# Patient Record
Sex: MALE | Race: WHITE | NOT HISPANIC OR LATINO | Employment: FULL TIME | URBAN - METROPOLITAN AREA
[De-identification: names, ages, dates, MRNs, and addresses within clinical notes are randomized per-mention and may not be internally consistent; named-entity substitution may affect disease eponyms.]

---

## 2023-11-09 ENCOUNTER — OFFICE VISIT (OUTPATIENT)
Dept: ORTHOPEDIC SURGERY | Facility: CLINIC | Age: 43
End: 2023-11-09
Payer: OTHER MISCELLANEOUS

## 2023-11-09 VITALS
HEIGHT: 71 IN | SYSTOLIC BLOOD PRESSURE: 118 MMHG | BODY MASS INDEX: 26.29 KG/M2 | DIASTOLIC BLOOD PRESSURE: 68 MMHG | WEIGHT: 187.8 LBS
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
## 2023-11-09 DIAGNOSIS — M75.51 BURSITIS OF RIGHT SHOULDER: ICD-10-CM

## 2023-11-09 DIAGNOSIS — R29.818 PSEUDOPARALYSIS: ICD-10-CM

## 2023-11-09 DIAGNOSIS — M75.41 IMPINGEMENT SYNDROME OF RIGHT SHOULDER: ICD-10-CM

## 2023-11-09 DIAGNOSIS — S46.001A ROTATOR CUFF INJURY, RIGHT, INITIAL ENCOUNTER: Primary | ICD-10-CM

## 2023-11-09 PROCEDURE — 99204 OFFICE O/P NEW MOD 45 MIN: CPT | Performed by: ORTHOPAEDIC SURGERY

## 2023-11-09 NOTE — PROGRESS NOTES
[facility] Office Visit - Isac Arrieta MD    Office Visit       Patient Name: Heladio Guadalupe    Chief Complaint:   Chief Complaint   Patient presents with   • Right Shoulder - Pain       Referring Physician: Mukesh Lindsey MD  - I appreciate the referral      History of Present Illness:   Heladio Guadalupe is a 42 y.o. male who presents with right body part: shoulder Reason: pain.  Onset:Onset: atraumatic and gradual in nature. The issue has been ongoing for 3 month(s). Pain is a 5/10 on the pain scale. Pain is described as Pain Characterization: throbbing. Associated symptoms include Symptoms: pain and popping. The pain is worse with sleeping, working, lying on affected side, and any movement of the joint; ice and pain medication and/or NSAID improve the pain. Previous treatments have included: NSAIDS. I have reviewed the patient's history of present illness as noted/entered above.    I have reviewed the patient's past medical history, surgical history, social history, family history, medications, and allergies as noted in the electronic medical record and as noted/entered.  I have reviewed the patient's review of systems as noted/enter and updated as noted in the patient's HPI.      RIGHT SHOULDER ACUTE INJURY WORK INJURY    WORKERS' COMPENSATION INJURY  Employer: [employer]  Job at work: manual labor, heavy duty  Date of Injury at Work: 9/22/2023 -- approximately 6-7 weeks prior to my first evaluation  [facility] occupational medicine note from Dr. Lindsey reviewed from 10/19/2023 --I agree with his assessment that the patient may have an acute work-related rotator cuff injury.  MRI indicated at this time.    Mechanism of Injury at Work: He notes he was working with a grout pump hose that was clogged and 3 different times it jerked and finally his right arm gave out with acute pain to the  shoulder.    Right-hand-dominant  Current Work Status: Light duty  TREATMENTS: Activity modifications, self exercise, work modifications  Diagnostic Tests: X-rays reviewed-no acute bony findings    [place]  Working on a project and [employer]-school        42 y.o. male  Body mass index is 26.19 kg/m².    Subjective   Subjective      Review of Systems   Constitutional:  Negative for chills, fever, unexpected weight gain and unexpected weight loss.   HENT:  Negative for congestion, postnasal drip and rhinorrhea.    Eyes:  Negative for blurred vision.   Respiratory:  Negative for shortness of breath.    Cardiovascular:  Negative for leg swelling.   Gastrointestinal:  Negative for abdominal pain, nausea and vomiting.   Genitourinary:  Negative for difficulty urinating.   Musculoskeletal:  Positive for arthralgias. Negative for gait problem, joint swelling and myalgias.   Skin:  Negative for skin lesions and wound.   Neurological:  Negative for dizziness, weakness, light-headedness and numbness.   Hematological:  Does not bruise/bleed easily.   Psychiatric/Behavioral:  Negative for depressed mood.         Past Medical History: History reviewed. No pertinent past medical history.    Past Surgical History:   Past Surgical History:   Procedure Laterality Date   • FRACTURE SURGERY Left 2001    left arm       Family History: History reviewed. No pertinent family history.    Social History:   Social History     Socioeconomic History   • Marital status: Single   Tobacco Use   • Smoking status: Every Day     Types: Cigarettes   • Smokeless tobacco: Never   Vaping Use   • Vaping Use: Never used   Substance and Sexual Activity   • Alcohol use: Never   • Drug use: Never   • Sexual activity: Defer       Medications: No current outpatient medications on file.    Allergies: No Known Allergies    The following portions of the patient's history were reviewed and updated as appropriate: allergies, current medications, past  "family history, past medical history, past social history, past surgical history and problem list.        Objective    Objective      Vital Signs:   Vitals:    11/09/23 1332   BP: 118/68   Weight: 85.2 kg (187 lb 12.8 oz)   Height: 180.3 cm (71\")       Ortho Exam:  General: no acute distress, comfortable  Vitals reviewed in chart    Musculoskeletal Exam    SIDE: Right shoulder  Shoulder Exam:    Tenderness: rotator cuff    Range of motion measurements (degrees)  Forward flexion/Abduction/External rotation at side/ER at 90/IR at 90/IR position  Active: Essentially pseudoparalytic with the right arm unable to abduct more than 70 degrees can forward flex 100 degrees, 40 degrees external rotation at the side  Passive: Pain limited 120/120/45/80/70    Painful arc of motion: yes  No evidence of septic joint  Pain with forward flexion and abduction greater: 90 degrees  Impingement testing Neer's test - positive/painful  Impingement testing Hawkin's test - positive/painful    Rotator Cuff Testing:  Tenderness to palpation at rotator cuff - YES  Rotator cuff testing Mirela's test - positive  Rotator cuff testing External rotation - no  Rotator cuff testing Lag signs -yes with Jobes testing  Rotator cuff testing Belly press - no  Pain with abduction great than 90 degrees - yes    Scapular dyskinesis - present, abnormal scapular motion    Long head of the biceps testing:  Montejo's test for biceps & Speed's test -painful  Bicipital groove tenderness to palpation/tenderness to palpation of biceps tendon -painful      Results Review:   Imaging Results (Last 24 Hours)       ** No results found for the last 24 hours. **          Right shoulder radiographic images 10/19/2023 [employer] right shoulder-no acute bony findings on my review    Procedures             Assessment / Plan      Assessment/Plan:   Problem List Items Addressed This Visit          Musculoskeletal and Injuries    Work related injury    Relevant Orders    MRI " Shoulder Right Without Contrast    Bursitis of right shoulder    Relevant Orders    MRI Shoulder Right Without Contrast    Impingement syndrome of right shoulder    Relevant Orders    MRI Shoulder Right Without Contrast       Other    Rotator cuff injury, right, initial encounter - Primary    Relevant Orders    MRI Shoulder Right Without Contrast    Pseudoparalysis       RIGHT SHOULDER    Diagnoses: as noted above    Mechanism of injury: Work injury    Treatment Plan: MRI right shoulder    Work status:   Work status:   RIGHT ARM  10 lbs lifting restriction with the affected arm  No overhead lifting with the affected arm  No repetitive lift push pull with the affected arm      MMI: not reached    Projection for MMI: To be determined    RIGHT SHOULDER  MRI of the shoulder is recommended.  Indication: suspected rotator cuff tear  The MRI is critical to evaluate for rotator cuff tearing and will help with possible surgical planning.      Follow Up: AFTER RIGHT SHOULDER MRI        Isac Arrieta MD, FAAOS  Orthopedic Surgeon  Fellowship Trained Shoulder and Elbow Surgeon  [facility]  Orthopedics and Sports Medicine  [address] [place] [address], [address]  [place] [address]    11/09/23  14:52 EST

## 2023-11-14 ENCOUNTER — TELEPHONE (OUTPATIENT)
Dept: ORTHOPEDIC SURGERY | Facility: CLINIC | Age: 43
End: 2023-11-14

## 2023-11-14 NOTE — TELEPHONE ENCOUNTER
Caller: Heladio Guadalupe    Relationship to patient: Self    Best call back number: [phone] (home)       Chief complaint: RIGHT SHOULDER    Type of visit: MRI    Requested date: CALLED TO CHECK STATUS     If rescheduling, when is the original appointment: NA     Additional notes:PATIENT CAN SEE THE ORDER IN HIS MY CHART. HE WANTS AN UPDATE AND TO KNOW IF THERE IS ANYTHING HE CAN DO TO MOVE THE PROCESS ALONG.

## 2023-12-10 ENCOUNTER — HOSPITAL ENCOUNTER (OUTPATIENT)
Dept: MRI IMAGING | Facility: HOSPITAL | Age: 43
Discharge: HOME | End: 2023-12-10
Admitting: ORTHOPAEDIC SURGERY
Payer: OTHER MISCELLANEOUS

## 2023-12-10 DIAGNOSIS — S46.001A ROTATOR CUFF INJURY, RIGHT, INITIAL ENCOUNTER: ICD-10-CM

## 2023-12-10 DIAGNOSIS — M75.41 IMPINGEMENT SYNDROME OF RIGHT SHOULDER: ICD-10-CM

## 2023-12-10 DIAGNOSIS — M75.51 BURSITIS OF RIGHT SHOULDER: ICD-10-CM
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
## 2023-12-10 PROCEDURE — 73221 MRI JOINT UPR EXTREM W/O DYE: CPT

## 2023-12-12 ENCOUNTER — OFFICE VISIT (OUTPATIENT)
Dept: ORTHOPEDIC SURGERY | Facility: CLINIC | Age: 43
End: 2023-12-12
Payer: OTHER MISCELLANEOUS

## 2023-12-12 VITALS
WEIGHT: 185 LBS | HEIGHT: 71 IN | DIASTOLIC BLOOD PRESSURE: 70 MMHG | SYSTOLIC BLOOD PRESSURE: 120 MMHG | BODY MASS INDEX: 25.9 KG/M2

## 2023-12-12 DIAGNOSIS — R29.818 PSEUDOPARALYSIS: ICD-10-CM

## 2023-12-12 DIAGNOSIS — M75.41 IMPINGEMENT SYNDROME OF RIGHT SHOULDER: ICD-10-CM

## 2023-12-12 DIAGNOSIS — M75.51 BURSITIS OF RIGHT SHOULDER: ICD-10-CM
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
## 2023-12-12 DIAGNOSIS — S46.001A ROTATOR CUFF INJURY, RIGHT, INITIAL ENCOUNTER: Primary | ICD-10-CM

## 2023-12-12 NOTE — PROGRESS NOTES
[facility] [place] [facility] Office Follow Up       Office Follow Up Visit       Patient Name: Heladio Guadalupe    Chief Complaint:   Chief Complaint   Patient presents with    Follow-up     1 month (MRI) follow-up: Rotator cuff injury, right       Referring Physician: No ref. provider found    History of Present Illness:   It has been 1  month(s) since Heladoi Guadalupe's last visit. Heladio Guadalupe returns to clinic today for F/U: follow-up of rightBody Part: shoulderReason: pain. The issue has been ongoing for 3 month(s) (DOI: 9-20-23). Heladio Guadalupe rates HIS/HER: hispain at 4/10 on the pain scale. Previous/current treatments: bracing and NSAIDS. Current symptoms:Symptoms: pain, popping, grinding, and giving way/buckling. The pain is worse with sleeping, working, and lying on affected side; resting and ice improves the pain. Overall, he/she: heis doing the same. I have reviewed the patient's history of present illness as noted/entered above.    I have reviewed the patient's past medical history, surgical history, social history, family history, medications, and allergies as noted in the electronic medical record and as noted/entered.  I have reviewed the patient's review of systems as noted/enter and updated as noted in the patient's HPI.      RIGHT SHOULDER ACUTE INJURY WORK INJURY     WORKERS' COMPENSATION INJURY  Employer: [employer]  Job at work: manual labor, heavy duty  Date of Injury at Work: 9/22/2023 -- approximately 6-7 weeks prior to my first evaluation  [facility] occupational medicine note from Dr. Lindsey reviewed from 10/19/2023 --I agree with his assessment that the patient may have an acute work-related rotator cuff injury.  MRI indicated at this time.     Mechanism of Injury at Work: He notes he was working with a grout pump hose that was clogged and 3 different times it jerked and finally his right arm gave out with acute pain  to the shoulder.     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 male  Body mass index is 26.19 kg/m².      12/12/2023:  RIGHT SHOULDER  Work injury  Feels the same as prior  MRI COMPLETED  He presented essentially pseudoparalytic in the right shoulder but fortunately clinically does appear to be somewhat better today.  MRI was reassuring as well because we were expecting possible large massive avulsion but he has a small partial bursal tear.  In light of these findings we counseled on operative and nonoperative measures I am very hopeful that he will respond to conservative course.  It can be frustrating and a work compensation scenario as the hope would be to restored to the prior preinjury state however in this case the partial tearing would likely not indicate a need for operative intervention although the partial tearing can persist.  The tearing is more intrasubstance and more medial rather than at the bone tendon interface.    Formal physical therapy is recommended at this time    [facility]      Subjective   Subjective      Review of Systems   Constitutional: Negative.  Negative for chills, fatigue and fever.   HENT: Negative.  Negative for congestion and dental problem.    Eyes: Negative.  Negative for blurred vision.   Respiratory: Negative.  Negative for shortness of breath.    Cardiovascular: Negative.  Negative for leg swelling.   Gastrointestinal: Negative.  Negative for abdominal pain.   Endocrine: Negative.  Negative for polyuria.   Genitourinary: Negative.  Negative for difficulty urinating.   Musculoskeletal:  Positive for arthralgias.   Skin: Negative.    Allergic/Immunologic: Negative.    Neurological: Negative.    Hematological: Negative.  Negative for adenopathy.  "  Psychiatric/Behavioral: Negative.  Negative for behavioral problems.         Past Medical History:   Past Medical History:   Diagnosis Date    Rotator cuff syndrome        Past Surgical History:   Past Surgical History:   Procedure Laterality Date    FRACTURE SURGERY Left 2001    left arm       Family History: History reviewed. No pertinent family history.    Social History:   Social History     Socioeconomic History    Marital status: Single   Tobacco Use    Smoking status: Every Day     Packs/day: 1.00     Years: 15.00     Additional pack years: 0.00     Total pack years: 15.00     Types: Cigarettes    Smokeless tobacco: Never   Vaping Use    Vaping Use: Never used   Substance and Sexual Activity    Alcohol use: Never    Drug use: Never    Sexual activity: Yes     Partners: Female     Birth control/protection: Condom, Same-sex partner       Medications: No current outpatient medications on file.    Allergies: No Known Allergies    The following portions of the patient's history were reviewed and updated as appropriate: allergies, current medications, past family history, past medical history, past social history, past surgical history and problem list.        Objective    Objective      Vital Signs:   Vitals:    12/12/23 1511   BP: 120/70   Weight: 83.9 kg (185 lb)   Height: 180 cm (70.87\")       Ortho Exam:  Right shoulder initially presented with concerns for profound pseudoparalysis.  With encouragement and additional testing today does appear to have improved active and passive range of motion but still has significant pain response and limitations with forward flexion abduction and Jobes testing.  Positive Michael positive Twyla.    Results Review:  Imaging Results (Last 24 Hours)       ** No results found for the last 24 hours. **            MRI Shoulder Right Without Contrast    Result Date: 12/11/2023  Impression: Irregular moderate-grade bursal-sided and interstitial tearing of the mid distal fibers of " the supraspinatus tendon at and immediately proximal to the footprint. Focal tear of the posterior superior labrum. Electronically Signed: Pavan Mcnally MD  12/11/2023 10:31 AM EST  Workstation ID: [id number]      I personally reviewed the imaging above.  Small partial intrasubstance tearing and some bursal sided tearing of the supraspinatus.  Degenerative labral findings noted as well-SLAP      Procedures            Assessment / Plan      Assessment/Plan:   Problem List Items Addressed This Visit          Musculoskeletal and Injuries    Work related injury    Relevant Orders    Ambulatory Referral to Physical Therapy Evaluate and treat, Ortho (Completed)    Bursitis of right shoulder    Relevant Orders    Ambulatory Referral to Physical Therapy Evaluate and treat, Ortho (Completed)    Impingement syndrome of right shoulder    Relevant Orders    Ambulatory Referral to Physical Therapy Evaluate and treat, Ortho (Completed)       Other    Rotator cuff injury, right, initial encounter - Primary    Relevant Orders    Ambulatory Referral to Physical Therapy Evaluate and treat, Ortho (Completed)    Pseudoparalysis       RIGHT SHOULDER     Diagnoses: as noted above     Mechanism of injury: Work injury     Treatment Plan: PT Rx    Counseled on operative versus nonoperative measures.  Nonoperative measures recommended.  Small partial intrasubstance and bursal sided fraying of the rotator cuff.      Counseled on the importance of work providing him time to perform his physical therapy so that he can have ample time to strengthen the shoulder and to recover.     Work status:   Work status:   RIGHT ARM  10 lbs lifting restriction with the affected arm  No overhead lifting with the affected arm  No repetitive lift push pull with the affected arm        MMI: not reached     Projection for MMI: To be determined    PT Rx    Follow Up: 2 months      Isac Arrieta MD, FAAOS  Orthopedic Surgeon  Fellowship Trained Shoulder and  Elbow Surgeon  [facility]  Orthopedics and Sports Medicine  [address], [address]  [place] [address]    12/12/23  16:14 EST

## 2023-12-20 ENCOUNTER — TREATMENT (OUTPATIENT)
Dept: PHYSICAL THERAPY | Facility: CLINIC | Age: 43
End: 2023-12-20
Payer: OTHER MISCELLANEOUS

## 2023-12-20 DIAGNOSIS — G89.29 CHRONIC RIGHT SHOULDER PAIN: Primary | ICD-10-CM

## 2023-12-20 DIAGNOSIS — M25.511 CHRONIC RIGHT SHOULDER PAIN: Primary | ICD-10-CM

## 2023-12-20 PROCEDURE — 97162 PT EVAL MOD COMPLEX 30 MIN: CPT | Performed by: PHYSICAL THERAPIST

## 2023-12-20 PROCEDURE — 97140 MANUAL THERAPY 1/> REGIONS: CPT | Performed by: PHYSICAL THERAPIST

## 2023-12-20 PROCEDURE — 97110 THERAPEUTIC EXERCISES: CPT | Performed by: PHYSICAL THERAPIST

## 2023-12-20 NOTE — PROGRESS NOTES
Physical Therapy Initial Evaluation and Plan of Care  [address]  [place] [address]    Patient: Heladio Guadalupe   : 1980  Diagnosis/ICD-10 Code:  No primary diagnosis found.  Referring practitioner: Isac Arrieta MD  Date of Initial Visit: 2023  Today's Date: 2023  Patient seen for Visit count could not be calculated. Make sure you are using a visit which is associated with an episode. session         Visit Diagnoses:  No diagnosis found.      Subjective Questionnaire: QuickDASH: 29.55      Subjective Evaluation    History of Present Illness  Mechanism of injury: Pt is a 43 year old male presenting to the clinic with a right shoulder injury that occurred at work 23. 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 The hose got clogged three separate times, and the last time the hose jerked his arm into external rotation. This pulled on his shoulder and caused pain. He went to see an ortho and got an MRI revealing a bursal and posterior labrum tear. He is currently on a weight restriction of 10 lbs at work. He is having the most pain when he lays down at night. He also have pain with reaching out to the side. He takes ibuprofen as needed. His ortho is hopeful he will respond to conservative management and will not require surgery.      Patient Occupation: [employer] Quality of life: excellent    Pain  Current pain ratin  At worst pain ratin  Quality: throbbing and dull ache  Aggravating factors: prolonged positioning, sleeping, repetitive movement and overhead activity  Progression: no change    Hand dominance: right    Diagnostic Tests  MRI studies: abnormal (see chart)    Patient Goals  Patient goals for therapy: decreased pain, increased motion, increased strength, independence with ADLs/IADLs, return to sport/leisure activities and return to work             Objective          Tenderness     Right Shoulder  Tenderness in  the subacromial bursa and supraspinatus tendon.     Active Range of Motion   Left Shoulder   Flexion: 170 degrees   Abduction: 175 degrees     Right Shoulder   Flexion: 151 degrees   Abduction: 118 degrees with pain    Additional Active Range of Motion Details  Apleys ER right: T3  Apleys ER left: T4  Apleys IR right: T10  Apleys IR left: T10    Strength/Myotome Testing     Left Shoulder     Planes of Motion   Flexion: 5   Abduction: 5   External rotation at 0°: 5   Internal rotation at 0°: 5     Isolated Muscles   Biceps: 5     Right Shoulder     Planes of Motion   Flexion: 5   Abduction: 4-   External rotation at 0°: 4+   Internal rotation at 0°: 5     Isolated Muscles   Biceps: 5     Tests     Right Shoulder   Positive Speed's.   Negative empty can and Hawkin's.           Assessment & Plan       Assessment  Impairments: abnormal muscle tone, abnormal or restricted ROM, activity intolerance, impaired physical strength, lacks appropriate home exercise program and pain with function   Functional limitations: lifting, sleeping, pulling, pushing, uncomfortable because of pain, reaching overhead and unable to perform repetitive tasks   Assessment details: Pt is a 43 year old male presenting to the clinic with right shoulder pain due to an injury at work. His MRI revealed small bursal and posterior labrum tear. He demonstrates decreased AROM, decrease strength, and positive Speeds test. His impairments are limiting his ability to sleep comfortably and perform his full duties at work. Pt would benefit from skilled PT to address his impairments so he can reach his long term goals.  Prognosis: good    Goals  Plan Goals: SHORT TERM GOALS:     2 weeks  1. Pt I w/ HEP  2. Pt to demonstrate PROM of the right shoulder to WFL to improve ability to perform ADL's  3. Pt to demonstrate ability to perform 30 minutes continuous activity in the clinic without increase in pain     LONG TERM GOALS:   6 weeks  1. Pt to demonstrate AROM  of the right shoulder to WNL to allow ability to perform all necessary functional activities  2. Pt to demonstrate ability to lift 5# OH with the right arm without increase in pain  3. Pt to report being able to work full duty without increase in pain in the shoulder  4. Pt to tolerate 60 minutes continuous activity in the clinic without increase in pain      Plan  Therapy options: will be seen for skilled therapy services  Planned modality interventions: cryotherapy, dry needling, electrical stimulation/Russian stimulation, high voltage pulsed current (pain management), TENS, thermotherapy (hydrocollator packs) and ultrasound  Planned therapy interventions: manual therapy, neuromuscular re-education, postural training, soft tissue mobilization, spinal/joint mobilization, strengthening, stretching, therapeutic activities, home exercise program, functional ROM exercises and body mechanics training  Duration in visits: 1  Duration in weeks: 12  Treatment plan discussed with: patient        History # of Personal Factors and/or Comorbidities: MODERATE (1-2)  Examination of Body System(s): # of elements: MODERATE (3)  Clinical Presentation: STABLE   Clinical Decision Making: MODERATE      Timed:         Manual Therapy:    8     mins  10681;     Therapeutic Exercise:    8     mins  91858;     Neuromuscular Len:        mins  74195;    Therapeutic Activity:          mins  21744;     Gait Training:           mins  19074;     Ultrasound:          mins  36049;    Ionto                                   mins   69277  Self Care                            mins   00098  Canalith Repos         mins 13619      Un-Timed:  Electrical Stimulation:         mins  16613 ( );  Dry Needling          mins self-pay  Traction          mins 94704  Low Eval          Mins  11059  Mod Eval     28     Mins  95261  High Eval                            Mins  58163        Timed Treatment:   16   mins   Total Treatment:     44    mins          PT: Tana Cintron, PT   License Number: [id number]  Electronically signed by Tana Cintron, PT, 12/20/23, 3:28 PM EST    Certification Period: 12/20/2023 thru 3/18/2024  I certify that the therapy services are furnished while this patient is under my care.  The services outlined above are required by this patient, and will be reviewed every 90 days.         Physician Signature:__________________________________________________    PHYSICIAN: Isac Arrieta MD  NPI: [id number]                                      DATE:      Please sign and return via fax to [email or web address] . Thank you, [facility] Physical Therapy.

## 2023-12-27 ENCOUNTER — TREATMENT (OUTPATIENT)
Dept: PHYSICAL THERAPY | Facility: CLINIC | Age: 43
End: 2023-12-27
Payer: MEDICAID

## 2023-12-27 DIAGNOSIS — G89.29 CHRONIC RIGHT SHOULDER PAIN: Primary | ICD-10-CM

## 2023-12-27 DIAGNOSIS — M25.511 CHRONIC RIGHT SHOULDER PAIN: Primary | ICD-10-CM

## 2023-12-27 PROCEDURE — 97112 NEUROMUSCULAR REEDUCATION: CPT | Performed by: PHYSICAL THERAPIST

## 2023-12-27 PROCEDURE — 97110 THERAPEUTIC EXERCISES: CPT | Performed by: PHYSICAL THERAPIST

## 2023-12-27 PROCEDURE — 97140 MANUAL THERAPY 1/> REGIONS: CPT | Performed by: PHYSICAL THERAPIST

## 2023-12-28 NOTE — PROGRESS NOTES
Physical Therapy Daily Progress Note    Subjective   Heladio Guadalupe reports: one of his exercises made him really sore the next day. He feels his arm is moving better, but it still hurts. He forgot his HEP at his last visit so he has been doing his exercises from memory.      Objective   See Exercise, Manual, and Modality Logs for complete treatment.       Assessment/Plan  Pt tolerated treatment well. He required cues for hand placement with his HEP. He was performing D2 flexion incorrectly which was causing his pain. Pt was re printed an HEP. Pt would benefit from continued skilled PT.     Progress per Plan of Care           Manual Therapy:    14     mins  20956;  Therapeutic Exercise:    23     mins  53407;     Neuromuscular Len:    10    mins  36631;    Therapeutic Activity:          mins  51039;     Gait Training:           mins  34737;     Ultrasound:         mins  88288;    Electrical Stimulation:         mins  26588 ( );  E-Stim Attended:         mins  26585  Iontophoresis          mins 54718   Traction          mins  27292  Fluidotherapy          mins  72361  Dry Needling         mins self-pay - No Charge  Paraffin          mins  20843    Timed Treatment:   47   mins   Total Treatment:     47   mins    Tana Cintron, PT, DPT  Physical Therapist

## 2024-01-03 ENCOUNTER — TREATMENT (OUTPATIENT)
Dept: PHYSICAL THERAPY | Facility: CLINIC | Age: 44
End: 2024-01-03
Payer: OTHER MISCELLANEOUS

## 2024-01-03 DIAGNOSIS — M25.511 CHRONIC RIGHT SHOULDER PAIN: Primary | ICD-10-CM

## 2024-01-03 DIAGNOSIS — G89.29 CHRONIC RIGHT SHOULDER PAIN: Primary | ICD-10-CM

## 2024-01-03 PROCEDURE — 97140 MANUAL THERAPY 1/> REGIONS: CPT | Performed by: PHYSICAL THERAPIST

## 2024-01-03 PROCEDURE — 97110 THERAPEUTIC EXERCISES: CPT | Performed by: PHYSICAL THERAPIST

## 2024-01-03 PROCEDURE — 97112 NEUROMUSCULAR REEDUCATION: CPT | Performed by: PHYSICAL THERAPIST

## 2024-01-03 NOTE — PROGRESS NOTES
Physical Therapy Daily Progress Note    Subjective   Heladio Guadalupe reports: his arm is moving better but he still has pain with reaching out to the side.      Objective   See Exercise, Manual, and Modality Logs for complete treatment.       Assessment/Plan  Pt tolerated treatment well. He had difficulty initiating movement with weighted scaption. He was instructed on an updated HEP to include IR/ER strengthening. Pt would benefit from continued skilled PT.    Progress per Plan of Care           Manual Therapy:    14     mins  90009;  Therapeutic Exercise:    23     mins  21757;     Neuromuscular Len:    10    mins  06727;    Therapeutic Activity:          mins  43702;     Gait Training:           mins  30215;     Ultrasound:         mins  68145;    Electrical Stimulation:         mins  94804 ( );  E-Stim Attended:         mins  30097  Iontophoresis          mins 54798   Traction         mins  09085  Fluidotherapy          mins  89144  Dry Needling          mins self-pay - No Charge  Paraffin          mins  16437    Timed Treatment:   47   mins   Total Treatment:     47   mins    Tana Cintron, PT, DPT  Physical Therapist

## 2024-01-10 ENCOUNTER — TREATMENT (OUTPATIENT)
Dept: PHYSICAL THERAPY | Facility: CLINIC | Age: 44
End: 2024-01-10
Payer: OTHER MISCELLANEOUS

## 2024-01-10 DIAGNOSIS — M25.511 CHRONIC RIGHT SHOULDER PAIN: Primary | ICD-10-CM

## 2024-01-10 DIAGNOSIS — G89.29 CHRONIC RIGHT SHOULDER PAIN: Primary | ICD-10-CM

## 2024-01-10 PROCEDURE — 97112 NEUROMUSCULAR REEDUCATION: CPT | Performed by: PHYSICAL THERAPIST

## 2024-01-10 PROCEDURE — 97110 THERAPEUTIC EXERCISES: CPT | Performed by: PHYSICAL THERAPIST

## 2024-01-10 PROCEDURE — 97140 MANUAL THERAPY 1/> REGIONS: CPT | Performed by: PHYSICAL THERAPIST

## 2024-01-10 NOTE — PROGRESS NOTES
Physical Therapy Daily Progress Note    Subjective   Heladio Guadalupe reports: he is no longer having pain at night, but he still has pain with reaching out to the side. His motion is improving.      Objective   See Exercise, Manual, and Modality Logs for complete treatment.       Assessment/Plan  Pt tolerated treatment well. He was able to tolerate more advanced exercises with min complaints of pain. He was given an updated HEP. Pt would benefit from continued skilled PT.     Progress per Plan of Care           Manual Therapy:    10    mins  62156;  Therapeutic Exercise:    23     mins  72595;     Neuromuscular Len:    12    mins  49461;    Therapeutic Activity:          mins  59237;     Gait Training:           mins  95737;     Ultrasound:         mins  75166;    Electrical Stimulation:         mins  70929 ( );  E-Stim Attended:         mins  66123  Iontophoresis          mins 90010   Traction          mins  49385  Fluidotherapy          mins  95264  Dry Needling         mins self-pay - No Charge  Paraffin          mins  22434    Timed Treatment:   45   mins   Total Treatment:     45   mins    Tana Cintron, PT, DPT  Physical Therapist

## 2024-01-15 ENCOUNTER — TREATMENT (OUTPATIENT)
Dept: PHYSICAL THERAPY | Facility: CLINIC | Age: 44
End: 2024-01-15
Payer: OTHER MISCELLANEOUS

## 2024-01-15 DIAGNOSIS — G89.29 CHRONIC RIGHT SHOULDER PAIN: Primary | ICD-10-CM

## 2024-01-15 DIAGNOSIS — M25.511 CHRONIC RIGHT SHOULDER PAIN: Primary | ICD-10-CM

## 2024-01-15 PROCEDURE — 97110 THERAPEUTIC EXERCISES: CPT | Performed by: PHYSICAL THERAPIST

## 2024-01-15 PROCEDURE — 97140 MANUAL THERAPY 1/> REGIONS: CPT | Performed by: PHYSICAL THERAPIST

## 2024-01-15 PROCEDURE — 97112 NEUROMUSCULAR REEDUCATION: CPT | Performed by: PHYSICAL THERAPIST

## 2024-01-15 NOTE — PROGRESS NOTES
Physical Therapy Daily Progress Note    Subjective   Heladio Guadalupe reports: his shoulder is feeling better. He can reach it out to the side with mild pain. He can lift heavier items as long as he keeps them close to his body.      Objective   See Exercise, Manual, and Modality Logs for complete treatment.       Assessment/Plan  Pt tolerated treatment well. He is steadily able to tolerate more advanced exercises. He was able to perform full active abduction with min complaints of pain. Pt would benefit form continued skilled PT.    Progress per Plan of Care           Manual Therapy:    10     mins  10832;  Therapeutic Exercise:    10     mins  57234;     Neuromuscular Len:    10    mins  14701;    Therapeutic Activity:          mins  85708;     Gait Training:           mins  60714;     Ultrasound:          mins  89134;    Electrical Stimulation:         mins  41966 ( );  E-Stim Attended:        mins  49725  Iontophoresis          mins 63840   Traction          mins  66101  Fluidotherapy          mins  00823  Dry Needling          mins self-pay - No Charge  Paraffin         mins  11410    Timed Treatment:   30   mins   Total Treatment:     46   mins    Tana Cintron, PT, DPT  Physical Therapist

## 2024-01-31 ENCOUNTER — TREATMENT (OUTPATIENT)
Dept: PHYSICAL THERAPY | Facility: CLINIC | Age: 44
End: 2024-01-31
Payer: OTHER MISCELLANEOUS

## 2024-01-31 DIAGNOSIS — M25.511 CHRONIC RIGHT SHOULDER PAIN: Primary | ICD-10-CM

## 2024-01-31 DIAGNOSIS — G89.29 CHRONIC RIGHT SHOULDER PAIN: Primary | ICD-10-CM

## 2024-01-31 PROCEDURE — 97112 NEUROMUSCULAR REEDUCATION: CPT | Performed by: PHYSICAL THERAPIST

## 2024-01-31 PROCEDURE — 97140 MANUAL THERAPY 1/> REGIONS: CPT | Performed by: PHYSICAL THERAPIST

## 2024-01-31 PROCEDURE — 97110 THERAPEUTIC EXERCISES: CPT | Performed by: PHYSICAL THERAPIST

## 2024-01-31 NOTE — PROGRESS NOTES
Physical Therapy Daily Progress Note    Subjective   Heladio Anayeli reports: he feels about 75% better since starting PT.  He still notices clicking and popping, but his pain is much better. He still does not feel strong when his arm is out to the side.    Today's Pain ratin/10    Objective          Active Range of Motion     Right Shoulder   Flexion: 170 degrees   Abduction: 162 degrees with pain    Additional Active Range of Motion Details  Apleys ER right: T3  Apleys IR right: T11    Strength/Myotome Testing     Right Shoulder     Planes of Motion   Flexion: 5   Abduction: 4   External rotation at 0°: 5   Internal rotation at 0°: 5     Tests     Right Shoulder   Negative Speed's.       See Exercise, Manual, and Modality Logs for complete treatment.       Assessment/Plan  Pt is a 43 year old male who has been attending PT for right shoulder pain due to a work injury. His ROM has improved significantly. His strength is also improving, but he is lacking some abduction strength. Pt would benefit from continued skilled PT to work more on strength and stabilization so he can reach his long term goals.    Progress per Plan of Care           Manual Therapy:    12     mins  55914;  Therapeutic Exercise:    24     mins  73413;     Neuromuscular Len:    10    mins  68298;    Therapeutic Activity:          mins  00458;     Gait Training:           mins  17696;     Ultrasound:          mins  49078;    Electrical Stimulation:         mins  65846 ( );  E-Stim Attended:         mins  45842  Iontophoresis          mins 64861   Traction          mins  50390  Fluidotherapy          mins  17619  Dry Needling          mins self-pay - No Charge  Paraffin          mins  09568    Timed Treatment:   46   mins   Total Treatment:     46   mins    Tana Cintron, PT, DPT  Physical Therapist

## 2024-02-07 ENCOUNTER — TREATMENT (OUTPATIENT)
Dept: PHYSICAL THERAPY | Facility: CLINIC | Age: 44
End: 2024-02-07
Payer: OTHER MISCELLANEOUS

## 2024-02-07 DIAGNOSIS — G89.29 CHRONIC RIGHT SHOULDER PAIN: Primary | ICD-10-CM

## 2024-02-07 DIAGNOSIS — M25.511 CHRONIC RIGHT SHOULDER PAIN: Primary | ICD-10-CM

## 2024-02-07 PROCEDURE — 97110 THERAPEUTIC EXERCISES: CPT | Performed by: PHYSICAL THERAPIST

## 2024-02-07 PROCEDURE — 97112 NEUROMUSCULAR REEDUCATION: CPT | Performed by: PHYSICAL THERAPIST

## 2024-02-07 PROCEDURE — 97140 MANUAL THERAPY 1/> REGIONS: CPT | Performed by: PHYSICAL THERAPIST

## 2024-02-07 NOTE — PROGRESS NOTES
Physical Therapy Daily Progress Note    Subjective   Heladio Anayeli reports: he is doing better each week but the pain is still present.      Objective   See Exercise, Manual, and Modality Logs for complete treatment.       Assessment/Plan  Pt tolerated treatment well. He is steadily able to tolerate more advanced exercises. He has the most weakness with abduction and ER. Pt is progressing well and would benefit from continued skilled PT.    Progress per Plan of Care           Manual Therapy:    10     mins  19027;  Therapeutic Exercise:    24     mins  65294;     Neuromuscular Len:    11    mins  30205;    Therapeutic Activity:          mins  18025;     Gait Training:           mins  37335;     Ultrasound:          mins  68737;    Electrical Stimulation:         mins  49353 ( );  E-Stim Attended:        mins  52418  Iontophoresis          mins 28833   Traction          mins  64678  Fluidotherapy          mins  03637  Dry Needling          mins self-pay - No Charge  Paraffin          mins  16666    Timed Treatment:   45   mins   Total Treatment:     45   mins    Tana Cintron, PT, DPT  Physical Therapist

## 2024-02-12 ENCOUNTER — TREATMENT (OUTPATIENT)
Dept: PHYSICAL THERAPY | Facility: CLINIC | Age: 44
End: 2024-02-12
Payer: OTHER MISCELLANEOUS

## 2024-02-12 DIAGNOSIS — M25.511 CHRONIC RIGHT SHOULDER PAIN: Primary | ICD-10-CM

## 2024-02-12 DIAGNOSIS — G89.29 CHRONIC RIGHT SHOULDER PAIN: Primary | ICD-10-CM

## 2024-02-12 PROCEDURE — 97140 MANUAL THERAPY 1/> REGIONS: CPT | Performed by: PHYSICAL THERAPIST

## 2024-02-12 PROCEDURE — 97110 THERAPEUTIC EXERCISES: CPT | Performed by: PHYSICAL THERAPIST

## 2024-02-12 PROCEDURE — 97112 NEUROMUSCULAR REEDUCATION: CPT | Performed by: PHYSICAL THERAPIST

## 2024-02-13 ENCOUNTER — OFFICE VISIT (OUTPATIENT)
Dept: ORTHOPEDIC SURGERY | Facility: CLINIC | Age: 44
End: 2024-02-13
Payer: OTHER MISCELLANEOUS

## 2024-02-13 VITALS
WEIGHT: 190.2 LBS | HEIGHT: 71 IN | BODY MASS INDEX: 26.63 KG/M2 | DIASTOLIC BLOOD PRESSURE: 80 MMHG | SYSTOLIC BLOOD PRESSURE: 118 MMHG

## 2024-02-13 DIAGNOSIS — M75.41 IMPINGEMENT SYNDROME OF RIGHT SHOULDER: ICD-10-CM

## 2024-02-13 DIAGNOSIS — S46.001A ROTATOR CUFF INJURY, RIGHT, INITIAL ENCOUNTER: ICD-10-CM

## 2024-02-13 DIAGNOSIS — M75.51 BURSITIS OF RIGHT SHOULDER: ICD-10-CM

## 2024-02-13 DIAGNOSIS — S46.011A TRAUMATIC INCOMPLETE TEAR OF RIGHT ROTATOR CUFF, INITIAL ENCOUNTER: Primary | ICD-10-CM
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
## 2024-02-13 PROCEDURE — 99213 OFFICE O/P EST LOW 20 MIN: CPT | Performed by: ORTHOPAEDIC SURGERY

## 2024-02-13 RX ORDER — IBUPROFEN 200 MG
200 TABLET ORAL EVERY 6 HOURS PRN
COMMUNITY

## 2024-02-19 ENCOUNTER — TELEPHONE (OUTPATIENT)
Age: 44
End: 2024-02-19
Payer: MEDICAID

## 2024-02-19 NOTE — TELEPHONE ENCOUNTER
Please let him know symptoms of shoulder impingement can flare as described for short period. Continue with ibuprofen or other anti-inflammatory consistently this week and try physical therapy with [facility] again. If no better next week we can reassess.    Thank you,  Florinda Owen PA-C         Sent message to patient via [employer].    Ashley ELIAS) ROT

## 2024-02-19 NOTE — TELEPHONE ENCOUNTER
----- Message from Heladio Guadalupe sent at 2/18/2024  4:45 PM EST -----  Regarding: Right shoulder and arm pain  Contact: [phone]  I just wanted to let you know that since Wednesday my shoulder has been bothering me even worse than usual. Especially at night I haven’t been able to get more than an hour or so of constant sleep. It has been waking me up throbbing and aching and the last 3 nights have been the worst. I have been taking ibuprofen like I have been since this started but over the last few days it isn’t helping like it was.   I am not able to lift it to the side again without it hurting worse.

## 2024-02-21 ENCOUNTER — TREATMENT (OUTPATIENT)
Dept: PHYSICAL THERAPY | Facility: CLINIC | Age: 44
End: 2024-02-21
Payer: OTHER MISCELLANEOUS

## 2024-02-21 DIAGNOSIS — M25.511 CHRONIC RIGHT SHOULDER PAIN: Primary | ICD-10-CM

## 2024-02-21 DIAGNOSIS — G89.29 CHRONIC RIGHT SHOULDER PAIN: Primary | ICD-10-CM

## 2024-02-21 PROCEDURE — 97110 THERAPEUTIC EXERCISES: CPT | Performed by: PHYSICAL THERAPIST

## 2024-02-21 PROCEDURE — 97112 NEUROMUSCULAR REEDUCATION: CPT | Performed by: PHYSICAL THERAPIST

## 2024-02-21 PROCEDURE — 97140 MANUAL THERAPY 1/> REGIONS: CPT | Performed by: PHYSICAL THERAPIST

## 2024-02-21 NOTE — PROGRESS NOTES
Physical Therapy Daily Progress Note    Subjective   Heladio Guadalupe reports: since last Thursday, he has had an increase in his pain. He is not sure what has caused this. He has been unable to sleep due to the pain. He gets pain when reaching out to the side.      Objective   See Exercise, Manual, and Modality Logs for complete treatment.       Assessment/Plan  Pt tolerated treatment with mod complaints of pain. His right bicep was very tight today. Regressed exercises to ROM and light strengthening. Pt would benefit from continued skilled PT.    Progress per Plan of Care           Manual Therapy:    16     mins  76233;  Therapeutic Exercise:    23     mins  26492;     Neuromuscular Len:    8    mins  48043;    Therapeutic Activity:          mins  66371;     Gait Training:           mins  21604;     Ultrasound:          mins  97883;    Electrical Stimulation:         mins  57156 ( );  E-Stim Attended:         mins  16994  Iontophoresis         mins 70522   Traction          mins  88799  Fluidotherapy          mins  92411  Dry Needling          mins self-pay - No Charge  Paraffin          mins  62736    Timed Treatment:   47   mins   Total Treatment:     47   mins    Tana Cintron, PT, DPT  Physical Therapist

## 2024-02-28 ENCOUNTER — TREATMENT (OUTPATIENT)
Dept: PHYSICAL THERAPY | Facility: CLINIC | Age: 44
End: 2024-02-28
Payer: OTHER MISCELLANEOUS

## 2024-02-28 DIAGNOSIS — G89.29 CHRONIC RIGHT SHOULDER PAIN: Primary | ICD-10-CM

## 2024-02-28 DIAGNOSIS — M25.511 CHRONIC RIGHT SHOULDER PAIN: Primary | ICD-10-CM

## 2024-02-28 PROCEDURE — 97140 MANUAL THERAPY 1/> REGIONS: CPT | Performed by: PHYSICAL THERAPIST

## 2024-02-28 PROCEDURE — 97110 THERAPEUTIC EXERCISES: CPT | Performed by: PHYSICAL THERAPIST

## 2024-02-28 PROCEDURE — 97112 NEUROMUSCULAR REEDUCATION: CPT | Performed by: PHYSICAL THERAPIST

## 2024-02-28 NOTE — PROGRESS NOTES
Physical Therapy Daily Progress Note    Subjective   Heladio Guadalupe reports: he is able to raise his arm out to the side but has mild pain. He is able to sleep now without pain.        Objective   See Exercise, Manual, and Modality Logs for complete treatment.       Assessment/Plan  Pt tolerated treatment well. He was able to complete more advanced exercises without pain. He seems to have moved past his most recent flare up and is back on track with his progress. Pt would benefit from continued skilled PT.    Progress per Plan of Care           Manual Therapy:    12     mins  44335;  Therapeutic Exercise:    24     mins  21074;     Neuromuscular Len:    10    mins  64111;    Therapeutic Activity:          mins  76927;     Gait Training:           mins  24918;     Ultrasound:          mins  26848;    Electrical Stimulation:        mins  35090 ( );  E-Stim Attended:         mins  07393  Iontophoresis          mins 66722   Traction          mins  41279  Fluidotherapy          mins  19131  Dry Needling         mins self-pay - No Charge  Paraffin          mins  78691    Timed Treatment:   46   mins   Total Treatment:     46   mins    Tana Cintron, PT, DPT  Physical Therapist  
1 pair

## 2024-02-29 ENCOUNTER — TELEPHONE (OUTPATIENT)
Dept: ORTHOPEDIC SURGERY | Facility: CLINIC | Age: 44
End: 2024-02-29

## 2024-03-06 ENCOUNTER — TREATMENT (OUTPATIENT)
Dept: PHYSICAL THERAPY | Facility: CLINIC | Age: 44
End: 2024-03-06
Payer: OTHER MISCELLANEOUS

## 2024-03-06 DIAGNOSIS — G89.29 CHRONIC RIGHT SHOULDER PAIN: Primary | ICD-10-CM

## 2024-03-06 DIAGNOSIS — M25.511 CHRONIC RIGHT SHOULDER PAIN: Primary | ICD-10-CM

## 2024-03-06 PROCEDURE — 97112 NEUROMUSCULAR REEDUCATION: CPT | Performed by: PHYSICAL THERAPIST

## 2024-03-06 PROCEDURE — 97110 THERAPEUTIC EXERCISES: CPT | Performed by: PHYSICAL THERAPIST

## 2024-03-06 PROCEDURE — 97140 MANUAL THERAPY 1/> REGIONS: CPT | Performed by: PHYSICAL THERAPIST

## 2024-03-06 NOTE — PROGRESS NOTES
Physical Therapy Daily Progress Note    Subjective   Heladio Guadalupe reports: his shoulder aches at night and in the morning, but throughout the day it is feeling better. He still feels tightness in his bicep.      Objective   See Exercise, Manual, and Modality Logs for complete treatment.       Assessment/Plan  Pt tolerated treatment well. Initiated bicep eccentric lowering without pain, just fatigue. He is progressing well and would benefit from continued skilled PT.     Progress per Plan of Care           Manual Therapy:    13     mins  34675;  Therapeutic Exercise:    24     mins  58574;     Neuromuscular Len:    10    mins  78329;    Therapeutic Activity:          mins  12782;     Gait Training:           mins  84270;     Ultrasound:          mins  17935;    Electrical Stimulation:         mins  02498 ( );  E-Stim Attended:         mins  02733  Iontophoresis          mins 74895   Traction          mins  53556  Fluidotherapy          mins  06817  Dry Needling          mins self-pay - No Charge  Paraffin          mins  44070    Timed Treatment:   47   mins   Total Treatment:     47   mins    Tana Cintron, PT, DPT  Physical Therapist

## 2024-03-13 ENCOUNTER — TREATMENT (OUTPATIENT)
Dept: PHYSICAL THERAPY | Facility: CLINIC | Age: 44
End: 2024-03-13
Payer: OTHER MISCELLANEOUS

## 2024-03-13 NOTE — PROGRESS NOTES
Physical Therapy Daily Treatment Note                [address] [facility] [address] [place] [address]       Patient: Heladio Guadalupe   : 1980  Diagnosis/ICD-10 Code:  No primary diagnosis found.  Referring practitioner: Isac Arrieta MD  Date of Initial Visit: Type: THERAPY  Noted: 2023  Today's Date: 3/13/2024  Patient seen for 12 sessions             Subjective   Heladio Guadalupe reports: had a lot of pain the following night after last session that caused a throbbing with sweats. It lasted most of the morning as well.     Objective   TTP, hypertonic and trigger points in the R biceps.   See Exercise, Manual, and Modality Logs for complete treatment.       Assessment/Plan  Decreased exercises and weight due to increased symptoms. Focused on completing exercises with no increase in pain, only fatigue.   Progress per Plan of Care and Progress strengthening /stabilization /functional activity           Timed:  Manual Therapy:    12     mins  42589;  Therapeutic Exercise:    28     mins  69369;     Neuromuscular Len:    10    mins  68999;    Therapeutic Activity:          mins  15530;     Gait Training:           mins  21363;     Ultrasound:          mins  68261;    Electrical Stimulation:         mins  31597;  Iontophoresis          mins  66602    Untimed:  Electrical Stimulation:         mins  32026 ( );  Mechanical Traction:         mins  48700;   Fluidotherapy          mins  22045    Timed Treatment:   50   mins   Total Treatment:     50   mins        Loan Jones PTA  Physical Therapist Assistant

## 2024-03-20 ENCOUNTER — TREATMENT (OUTPATIENT)
Dept: PHYSICAL THERAPY | Facility: CLINIC | Age: 44
End: 2024-03-20
Payer: OTHER MISCELLANEOUS

## 2024-03-20 DIAGNOSIS — G89.29 CHRONIC RIGHT SHOULDER PAIN: Primary | ICD-10-CM

## 2024-03-20 DIAGNOSIS — M25.511 CHRONIC RIGHT SHOULDER PAIN: Primary | ICD-10-CM

## 2024-03-20 PROCEDURE — 97112 NEUROMUSCULAR REEDUCATION: CPT | Performed by: PHYSICAL THERAPIST

## 2024-03-20 PROCEDURE — 97110 THERAPEUTIC EXERCISES: CPT | Performed by: PHYSICAL THERAPIST

## 2024-03-20 PROCEDURE — 97140 MANUAL THERAPY 1/> REGIONS: CPT | Performed by: PHYSICAL THERAPIST

## 2024-03-20 NOTE — PROGRESS NOTES
Physical Therapy Daily Progress Note    Subjective   Heladio Guadalupe reports: his bicep is feeling better but he still has pain and instability with activities reaching out to the side.      Objective   See Exercise, Manual, and Modality Logs for complete treatment.       Assessment/Plan  Pt tolerated treatment well. He does not tolerate abduction activities well, but all other movements are strong until he gets to end range. Encouraged him to stay patient and that progress can be slow. Pt would benefit from continued skilled PT.     Progress per Plan of Care           Manual Therapy:    17     mins  03526;  Therapeutic Exercise:    15     mins  72223;     Neuromuscular Len:    10    mins  72203;    Therapeutic Activity:          mins  17110;     Gait Training:           mins  37652;     Ultrasound:          mins  70502;    Electrical Stimulation:         mins  71487 ( );  E-Stim Attended:         mins  64943  Iontophoresis          mins 54050   Traction          mins  87845  Fluidotherapy          mins  42823  Dry Needling          mins self-pay - No Charge  Paraffin          mins  37763    Timed Treatment:   42   mins   Total Treatment:     42   mins    Tana Cintron, PT, DPT  Physical Therapist

## 2024-03-27 ENCOUNTER — TREATMENT (OUTPATIENT)
Dept: PHYSICAL THERAPY | Facility: CLINIC | Age: 44
End: 2024-03-27
Payer: OTHER MISCELLANEOUS

## 2024-03-27 DIAGNOSIS — G89.29 CHRONIC RIGHT SHOULDER PAIN: Primary | ICD-10-CM

## 2024-03-27 DIAGNOSIS — M25.511 CHRONIC RIGHT SHOULDER PAIN: Primary | ICD-10-CM

## 2024-03-27 PROCEDURE — 97112 NEUROMUSCULAR REEDUCATION: CPT | Performed by: PHYSICAL THERAPIST

## 2024-03-27 PROCEDURE — 97140 MANUAL THERAPY 1/> REGIONS: CPT | Performed by: PHYSICAL THERAPIST

## 2024-03-27 PROCEDURE — 97110 THERAPEUTIC EXERCISES: CPT | Performed by: PHYSICAL THERAPIST

## 2024-03-27 NOTE — PROGRESS NOTES
Physical Therapy Daily Progress Note    Subjective   Heladio Guadalupe reports: he continues to have pain the night after he comes to PT, but by the next night he feels ok. He is taking ibuprofen daily.       Objective   See Exercise, Manual, and Modality Logs for complete treatment.       Assessment/Plan  Pt tolerated treatment well. He responded well to median nerve glides and was instructed to start performing them twice per day. He continues to have pain with abduction. Pt would benefit from continued skilled PT.     Progress per Plan of Care           Manual Therapy:    10     mins  98425;  Therapeutic Exercise:    10     mins  20316;     Neuromuscular Len:    10    mins  10266;    Therapeutic Activity:          mins  78466;     Gait Training:           mins  61090;     Ultrasound:          mins  33192;    Electrical Stimulation:         mins  46534 ( );  E-Stim Attended:         mins  19433  Iontophoresis          mins 95841   Traction          mins  82643  Fluidotherapy          mins  43589  Dry Needling          mins self-pay - No Charge  Paraffin          mins  86104    Timed Treatment:   30   mins   Total Treatment:     49   mins    Tana Cintron, PT, DPT  Physical Therapist

## 2024-04-03 ENCOUNTER — TREATMENT (OUTPATIENT)
Dept: PHYSICAL THERAPY | Facility: CLINIC | Age: 44
End: 2024-04-03
Payer: OTHER MISCELLANEOUS

## 2024-04-03 DIAGNOSIS — M25.511 CHRONIC RIGHT SHOULDER PAIN: Primary | ICD-10-CM

## 2024-04-03 DIAGNOSIS — G89.29 CHRONIC RIGHT SHOULDER PAIN: Primary | ICD-10-CM

## 2024-04-03 PROCEDURE — 97140 MANUAL THERAPY 1/> REGIONS: CPT | Performed by: PHYSICAL THERAPIST

## 2024-04-03 PROCEDURE — 97110 THERAPEUTIC EXERCISES: CPT | Performed by: PHYSICAL THERAPIST

## 2024-04-03 PROCEDURE — 97112 NEUROMUSCULAR REEDUCATION: CPT | Performed by: PHYSICAL THERAPIST

## 2024-04-03 NOTE — PROGRESS NOTES
Physical Therapy Daily Progress Note    Subjective   Heladio Guadalupe reports: he was feeling pretty good after his last visit, but he went on a fishing trip over the weekend and the repetitive movement caused him to get really sore.       Objective          Strength/Myotome Testing     Right Shoulder     Planes of Motion   Flexion: 5   Abduction: 4   External rotation at 0°: 5       See Exercise, Manual, and Modality Logs for complete treatment.       Assessment/Plan  Pt tolerated treatment well. His strength is improving but he continues to have pain with abduction. He will be re assessed next visit before returning to his ortho. Pt would benefit from continued skilled PT.     Progress per Plan of Care           Manual Therapy:    12     mins  25910;  Therapeutic Exercise:    24     mins  31935;     Neuromuscular Len:    10    mins  87702;    Therapeutic Activity:          mins  75113;     Gait Training:           mins  51322;     Ultrasound:          mins  29303;    Electrical Stimulation:         mins  22198 ( );  E-Stim Attended:         mins  62536  Iontophoresis          mins 24411   Traction          mins  74968  Fluidotherapy          mins  64291  Dry Needling          mins self-pay - No Charge  Paraffin          mins  02291    Timed Treatment:   46   mins   Total Treatment:     46   mins    Tana Cintron, PT, DPT  Physical Therapist

## 2024-04-10 ENCOUNTER — TREATMENT (OUTPATIENT)
Dept: PHYSICAL THERAPY | Facility: CLINIC | Age: 44
End: 2024-04-10
Payer: OTHER MISCELLANEOUS

## 2024-04-10 DIAGNOSIS — G89.29 CHRONIC RIGHT SHOULDER PAIN: Primary | ICD-10-CM

## 2024-04-10 DIAGNOSIS — M25.511 CHRONIC RIGHT SHOULDER PAIN: Primary | ICD-10-CM

## 2024-04-10 PROCEDURE — 97110 THERAPEUTIC EXERCISES: CPT | Performed by: PHYSICAL THERAPIST

## 2024-04-10 PROCEDURE — 97140 MANUAL THERAPY 1/> REGIONS: CPT | Performed by: PHYSICAL THERAPIST

## 2024-04-10 PROCEDURE — 97112 NEUROMUSCULAR REEDUCATION: CPT | Performed by: PHYSICAL THERAPIST

## 2024-04-10 NOTE — PROGRESS NOTES
Re-Assessment / Re-Certification      Patient: Heladio Guadalupe   : 1980  Diagnosis/ICD-10 Code:  No primary diagnosis found.  Referring practitioner: Isac Arrieta MD  Date of Initial Visit: 4/10/2024  Today's Date: 4/10/2024  Patient seen for 16 sessions      Subjective:   Heladio Guadalupe reports: he feels about 70% better since starting PT, but he still continues to have pain every single day. This pain always occurs when he reaches out to the side. He also has a lot of pain at night. He was taking ibuprofen, but he has stopped because it was causing stomach issues. He is not able to fish or play basketball with his son. He has been adhering to his light duty precautions at work.  Subjective Questionnaire: QuickDASH: 40.91  Clinical Progress: improved  Home Program Compliance: Yes  Treatment has included: therapeutic exercise, neuromuscular re-education, manual therapy, and therapeutic activity    Subjective Evaluation    Pain  Current pain ratin  At worst pain ratin       Objective          Tenderness     Right Shoulder  Tenderness in the supraspinatus tendon.     Active Range of Motion     Right Shoulder   Flexion: 175 degrees   Abduction: 160 degrees     Additional Active Range of Motion Details  Apleys ER right: T4  Apleys IR right: L1    Strength/Myotome Testing     Left Shoulder     Planes of Motion   Flexion: 5   Abduction: 5   External rotation at 0°: 5   Internal rotation at 0°: 5     Right Shoulder     Planes of Motion   Flexion: 5   Abduction: 4-   External rotation at 0°: 5   Internal rotation at 0°: 5       Assessment & Plan       Assessment  Assessment details:  Pt is a 43 year old male who has been coming to PT for right shoulder pain due to an injury that occurred at work. He initially made significant progress, but had a setback in February. Since then, he has not improved as rapidly. He has had some improvement in his AROM. His shoulder abduction strength is worse since his  last reassessment. His QuickDASH score has also worsened. We discussed his injury at length and went over the anatomy of the rotator cuff to explain why his pain is where it is. He follows up with his ortho next week to discuss further treatment options.      Progress toward previous goals: Partially Met      SHORT TERM GOALS:     2 weeks  1. Pt I w/ HEP (MET)  2. Pt to demonstrate PROM of the right shoulder to WFL to improve ability to perform ADL's (MET)  3. Pt to demonstrate ability to perform 30 minutes continuous activity in the clinic without increase in pain (MET)     LONG TERM GOALS:   6 weeks  1. Pt to demonstrate AROM of the right shoulder to WNL to allow ability to perform all necessary functional activities (MET)  2. Pt to demonstrate ability to lift 5# OH with the right arm without increase in pain (GOAL NOT MET)  3. Pt to report being able to work full duty without increase in pain in the shoulder (GOAL NOT MET)  4. Pt to tolerate 60 minutes continuous activity in the clinic without increase in pain  (GOAL NOT MET)  New Goals  No new goals      Recommendations: Continue with recommendations follow up with ortho to discuss further treatment options. If his ortho would like for him to continue PT, we will continue 1x per week for 6 more weeks.  Timeframe: 6 weeks  Prognosis to achieve goals: fair    PT Signature: Tana Cintron, PT      Based upon review of the patient's progress and continued therapy plan, it is my medical opinion that Heladio Guadalupe should continue physical therapy treatment at [facility]  [address] [place] [id number].    Signature: __________________________________  Isac Arrieta MD    Manual Therapy:    18     mins  68795;  Therapeutic Exercise:    23     mins  45495;     Neuromuscular Len:    10    mins  70153;    Therapeutic Activity:          mins  85639;     Gait Training:           mins   80347;     Ultrasound:          mins  39693;    Electrical Stimulation:         mins  66951 ( );  E-Stim Attended:         mins  24978  Iontophoresis          mins 59937   Traction          mins  91871  Fluidotherapy          mins  58793  Dry Needling          mins self-pay  Paraffin          mins  59552    Timed Treatment:   51   mins   Total Treatment:     51   mins

## 2024-04-16 ENCOUNTER — OFFICE VISIT (OUTPATIENT)
Dept: ORTHOPEDIC SURGERY | Facility: CLINIC | Age: 44
End: 2024-04-16
Payer: OTHER MISCELLANEOUS

## 2024-04-16 VITALS
BODY MASS INDEX: 26.46 KG/M2 | SYSTOLIC BLOOD PRESSURE: 132 MMHG | DIASTOLIC BLOOD PRESSURE: 88 MMHG | HEIGHT: 71 IN | WEIGHT: 189 LBS

## 2024-04-16 DIAGNOSIS — M75.51 BURSITIS OF RIGHT SHOULDER: ICD-10-CM
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
## 2024-04-16 DIAGNOSIS — S43.431D SUPERIOR GLENOID LABRUM LESION OF RIGHT SHOULDER, SUBSEQUENT ENCOUNTER: ICD-10-CM

## 2024-04-16 DIAGNOSIS — S46.011A TRAUMATIC INCOMPLETE TEAR OF RIGHT ROTATOR CUFF, INITIAL ENCOUNTER: Primary | ICD-10-CM

## 2024-04-16 DIAGNOSIS — S46.001A ROTATOR CUFF INJURY, RIGHT, INITIAL ENCOUNTER: ICD-10-CM

## 2024-04-16 DIAGNOSIS — M75.41 IMPINGEMENT SYNDROME OF RIGHT SHOULDER: ICD-10-CM

## 2024-04-16 DIAGNOSIS — R29.818 PSEUDOPARALYSIS: ICD-10-CM

## 2024-04-16 DIAGNOSIS — M75.21 BICEPS TENDINITIS OF RIGHT UPPER EXTREMITY: ICD-10-CM

## 2024-04-16 PROBLEM — S43.431A SUPERIOR GLENOID LABRUM LESION OF RIGHT SHOULDER: Status: ACTIVE | Noted: 2024-04-16

## 2024-04-16 PROCEDURE — 99214 OFFICE O/P EST MOD 30 MIN: CPT | Performed by: ORTHOPAEDIC SURGERY

## 2024-04-16 NOTE — PROGRESS NOTES
[facility] Office Follow Up       Office Follow Up Visit       Patient Name: Heladio Guadalupe    Chief Complaint:   Chief Complaint   Patient presents with   • Follow-up     2 month follow-up: Traumatic incomplete tear of right rotator cuff; Workers' Compensation injury (DOI: 9/22/23)       Referring Physician: No ref. provider found    History of Present Illness:   It has been 2  month(s) since Heladio Guadalupe's last visit. Heladio Guadalupe returns to clinic today for F/U: follow-up of rightBody Part: shoulderReason: pain. The issue has been ongoing for 6 month(s). Heladio Guadalupe rates HIS/HER: hispain at 5/10 on the pain scale. Previous/current treatments: NSAIDS and physical therapy. Current symptoms:Symptoms: pain, popping, grinding, and giving way/buckling. The pain is worse with sleeping, working, lying on affected side, and any movement of the joint; ice improves the pain. Overall, he/she: heis doing the same. I have reviewed the patient's history of present illness as noted/entered above.    I have reviewed the patient's past medical history, surgical history, social history, family history, medications, and allergies as noted in the electronic medical record and as noted/entered.  I have reviewed the patient's review of systems as noted/enter and updated as noted in the patient's HPI.      RIGHT SHOULDER ACUTE INJURY WORK INJURY     WORKERS' COMPENSATION INJURY  Employer: [employer]  Job at work: manual labor, heavy duty  Date of Injury at Work: 9/22/2023 -- approximately 6-7 weeks prior to my first evaluation  [facility] occupational medicine note from Dr. Lindsey reviewed from 10/19/2023 --I agree with his assessment that the patient may have an acute work-related rotator cuff injury.  MRI indicated at this time.     Mechanism of Injury at Work: He notes he was working with a grout pump hose that was clogged and 3  different times it jerked and finally his right arm gave out with acute pain to the shoulder.     Right-hand-dominant  Current Work Status: Light duty  TREATMENTS: Activity modifications, self exercise, work modifications  Diagnostic Tests: X-rays reviewed-no acute bony findings     [place]  Working on a [employer]           42 y.o. male  Body mass index is 26.19 kg/m².        12/12/2023:  RIGHT SHOULDER  Work injury  Feels the same as prior  MRI COMPLETED  He presented essentially pseudoparalytic in the right shoulder but fortunately clinically does appear to be somewhat better today.  MRI was reassuring as well because we were expecting possible large massive avulsion but he has a small partial bursal tear.  In light of these findings we counseled on operative and nonoperative measures I am very hopeful that he will respond to conservative course.  It can be frustrating and a work compensation scenario as the hope would be to restored to the prior preinjury state however in this case the partial tearing would likely not indicate a need for operative intervention although the partial tearing can persist.  The tearing is more intrasubstance and more medial rather than at the bone tendon interface.     Formal physical therapy is recommended at this time     [facility]     PRIOR COUNSELING:  Treatment Plan: PT Rx     Counseled on operative versus nonoperative measures.  Nonoperative measures recommended.  Small partial intrasubstance and bursal sided fraying of the rotator cuff.        Counseled on the importance of work providing him time to perform his physical therapy so that he can have ample time to strengthen the shoulder and to recover.     Work status:   Work status:   RIGHT ARM  10 lbs lifting restriction with the affected arm  No overhead lifting with the affected arm  No repetitive lift push pull with the affected arm           2/13/2024:  RIGHT  SHOULDER  Improved/better  Almost 5 months post-injury  WORKERS' COMPENSATION INJURY  Employer: [employer]  Job at work: manual labor, heavy duty  Date of Injury at Work: 9/22/2023 -- approximately 6-7 weeks prior to my first evaluation  [facility] occupational medicine note from Dr. Lindsey reviewed from 10/19/2023      PT: Shirley Valle --- Constantine is helping him to make excellent gains at this time and he will continue to benefit from physical therapy services.     Improvements of the right shoulder noted he is no longer pseudoparalytic but still has impingement and pain with abduction still feels some weakness will continue to benefit from physical therapy      4/16/2024:  RIGHT SHOULDER  WORK INJURY  MRI re-reviewed today  Corresponded with Constantine Cintron, PT -- plateau'd with PT -- no longer see prior improvements, pain and dysfunction persist  Some anterior biceps pain -- SLAP tear on MRI  Rotator cuff tear  Failed MD guided Physical therapy    MRI Shoulder Right Without Contrast     Result Date: 12/11/2023  Impression: Irregular moderate-grade bursal-sided and interstitial tearing of the mid distal fibers of the supraspinatus tendon at and immediately proximal to the footprint. Focal tear of the posterior superior labrum. Electronically Signed: Pavan Mcnally MD  12/11/2023 10:31 AM EST  Workstation ID: [id number]    I personally reviewed and personally interpreted the imaging above.    [facility] contest in [place] - finals Jan 2025        Subjective   Subjective      Review of Systems   Constitutional: Negative.  Negative for chills, fatigue and fever.   HENT: Negative.  Negative for congestion and dental problem.    Eyes: Negative.  Negative for blurred vision.   Respiratory: Negative.  Negative for shortness of breath.    Cardiovascular: Negative.  Negative for leg swelling.   Gastrointestinal: Negative.  Negative for abdominal pain.   Endocrine: Negative.  Negative for polyuria.  "  Genitourinary: Negative.  Negative for difficulty urinating.   Musculoskeletal:  Positive for arthralgias.   Skin: Negative.    Allergic/Immunologic: Negative.    Neurological: Negative.    Hematological: Negative.  Negative for adenopathy.   Psychiatric/Behavioral: Negative.  Negative for behavioral problems.         Past Medical History:   Past Medical History:   Diagnosis Date   • Rotator cuff syndrome        Past Surgical History:   Past Surgical History:   Procedure Laterality Date   • FRACTURE SURGERY Left 2001    left arm       Family History: History reviewed. No pertinent family history.    Social History:   Social History     Socioeconomic History   • Marital status: Single   Tobacco Use   • Smoking status: Every Day     Current packs/day: 2.00     Average packs/day: 2.0 packs/day for 29.3 years (58.5 ttl pk-yrs)     Types: Cigarettes     Start date: 1/9/1995     Passive exposure: Current   • Smokeless tobacco: Never   Vaping Use   • Vaping status: Never Used   Substance and Sexual Activity   • Alcohol use: Never   • Drug use: Never   • Sexual activity: Yes     Partners: Female     Birth control/protection: Condom       Medications:   Current Outpatient Medications:   •  ibuprofen (ADVIL,MOTRIN) 200 MG tablet, Take 1 tablet by mouth Every 6 (Six) Hours As Needed for Mild Pain., Disp: , Rfl:     Allergies: No Known Allergies    The following portions of the patient's history were reviewed and updated as appropriate: allergies, current medications, past family history, past medical history, past social history, past surgical history and problem list.        Objective    Objective      Vital Signs:   Vitals:    04/16/24 1409   BP: 132/88   Weight: 85.7 kg (189 lb)   Height: 180 cm (70.87\")       Ortho Exam:  RIGHT SHOULDER  Significant painful arc of motion.  Initially when he presented he was pseudoparalytic at this time he has dysrhythmic motion with forward flexion and abduction he can forward flex to be " keeps his arm really close to his body/abducted he is starting to improve some abduction very difficult going overhead and difficulty extending posteriorly.    Anterior biceps pain with uppercut anterior biceps pain with speeds testing tenderness palpation about the biceps but primary pain generator appears to be the rotator cuff.  Positive Neer positive [place] positive Jobes testing pain and weakness with abduction resisted.  Poor arc of motion/dysrhythmic as noted    120/120/45/80/70 active and passive range of motion-pain limited.    Positive rotator cuff pathology and also positive biceps testing.        Results Review:  Imaging Results (Last 24 Hours)       ** No results found for the last 24 hours. **          MRI Shoulder Right Without Contrast     Result Date: 12/11/2023  Impression: Irregular moderate-grade bursal-sided and interstitial tearing of the mid distal fibers of the supraspinatus tendon at and immediately proximal to the footprint. Focal tear of the posterior superior labrum. Electronically Signed: Pavan Mcnally MD  12/11/2023 10:31 AM EST  Workstation ID: [id number]    I personally reviewed and personally interpreted the imaging above.      Procedures            Assessment / Plan      Assessment/Plan:   Problem List Items Addressed This Visit          Musculoskeletal and Injuries    Work related injury    Relevant Orders    External Facility Surgical/Procedural Request    Bursitis of right shoulder    Relevant Orders    External Facility Surgical/Procedural Request    Impingement syndrome of right shoulder    Relevant Orders    External Facility Surgical/Procedural Request    Traumatic incomplete tear of right rotator cuff - Primary    Relevant Orders    External Facility Surgical/Procedural Request    Biceps tendinitis of right upper extremity    Relevant Orders    External Facility Surgical/Procedural Request    Superior glenoid labrum lesion of right shoulder    Relevant Orders    External  "Facility Surgical/Procedural Request       Other    Rotator cuff injury, right, initial encounter    Relevant Orders    External Facility Surgical/Procedural Request    Pseudoparalysis    Relevant Orders    External Facility Surgical/Procedural Request     RIGHT SHOULDER    Risks and benefits of continued nonoperative management versus surgical management were discussed. The patient desires to proceed with operative intervention.    Rotator cuff repair with possible biceps tenodesis and subacromial decompression with acromioplasty as indicated. Sometimes the rotator cuff tear is not repairable and may require debridement or partial repair. The procedure is routinely done arthroscopically, but sometimes a larger incision needs to be made to complete the repair in an \"open\" fashion for rare cases.    Specific risks include pain, bleeding, infection, injury to surrounding nerve and blood vessels, fracture, failure or lack of healing of rotator cuff repair, incomplete pain relief, hardware failure, potential need for additional procedures, stiffness after surgery, possible biceps deformity, and potential inability to restore range of motion and strength. Medical, anesthetic, and block complications are possible.    General anesthesia is required, sling compliance, and compliance with physical therapy and/or a surgeon guided exercise program will be very important to the recovery process.    Opioid medications are utilized for a short course after surgery for pain control.     RIGHT SHOULDER  Rotator cuff tear - Arthroscopic rotator cuff repair CPT code 59139  Biceps tendonitis and SLAP tear on MRI - Arthroscopic biceps tenodesis CPT code 87384 - POSSIBLE  Shoulder impingement syndrome    Counseled on irregular/atypical tear features of his rotator cuff with intrasubstance tearing and some medial based tearing of the tendon.    [facility]      Ultrasling III - a neutral rotation sling is recommended for this patient for " perioperative care.  The patient was fitted for the sling and the sling was provided today.  The sling will be a critical part of the perioperative care for these diagnoses.      The patient desires to proceed with RIGHT shoulder surgery.         Diagnoses: as noted above     Mechanism of injury: Work injury     Treatment Plan: Patient desires to proceed with surgery     Work status:   RIGHT ARM  10 lbs lifting restriction with the affected arm  No overhead lifting with the affected arm  No repetitive lift push pull with the affected arm        Follow-up: Desires to proceed with surgery RIGHT SHOULDER        Isac Arrieta MD, FAAOS  Orthopedic Surgeon  Fellowship Trained Shoulder and Elbow Surgeon  [facility]  Orthopedics and Sports Medicine  [address] [place] [address], [address]  [place] [address]    04/16/24  15:55 EDT

## 2024-04-16 NOTE — LETTER
April 16, 2024     Tana Cintron, PT  [address] [facility] [address]  [facility] [address]    Patient: Heladio Guadalupe   YOB: 1980   Date of Visit: 4/16/2024       Dear Tana Cintron, PT    Heladio Guadalupe was in my office today. Below is a copy of my note.    If you have questions, please do not hesitate to call me. I look forward to following Heladio along with you.         Sincerely,        Isac Arrieta MD        CC: No Recipients                                                                    [facility] Office Follow Up       Office Follow Up Visit       Patient Name: Heladio Guadalupe    Chief Complaint:   Chief Complaint   Patient presents with   • Follow-up     2 month follow-up: Traumatic incomplete tear of right rotator cuff; Workers' Compensation injury (DOI: 9/22/23)       Referring Physician: No ref. provider found    History of Present Illness:   It has been 2  month(s) since Heladio Guadalupe's last visit. Heladio Guadalupe returns to clinic today for F/U: follow-up of rightBody Part: shoulderReason: pain. The issue has been ongoing for 6 month(s). Heladio Guadalupe rates HIS/HER: hispain at 5/10 on the pain scale. Previous/current treatments: NSAIDS and physical therapy. Current symptoms:Symptoms: pain, popping, grinding, and giving way/buckling. The pain is worse with sleeping, working, lying on affected side, and any movement of the joint; ice improves the pain. Overall, he/she: heis doing the same. I have reviewed the patient's history of present illness as noted/entered above.    I have reviewed the patient's past medical history, surgical history, social history, family history, medications, and allergies as noted in the electronic medical record and as noted/entered.  I have reviewed the patient's review of systems as noted/enter and updated as noted in the patient's HPI.      RIGHT SHOULDER ACUTE INJURY WORK INJURY     WORKERS' COMPENSATION  INJURY  Employer: [employer]  Job at work: manual labor, heavy duty  Date of Injury at Work: 9/22/2023 -- approximately 6-7 weeks prior to my first evaluation  [facility] occupational medicine note from Dr. Lindsey reviewed from 10/19/2023 --I agree with his assessment that the patient may have an acute work-related rotator cuff injury.  MRI indicated at this time.     Mechanism of Injury at Work: He notes he was working with a grout pump hose that was clogged and 3 different times it jerked and finally his right arm gave out with acute pain to the shoulder.     Right-hand-dominant  Current Work Status: Light duty  TREATMENTS: Activity modifications, self exercise, work modifications  Diagnostic Tests: X-rays reviewed-no acute bony findings     [place]  Working on a [employer]-school           42 y.o. male  Body mass index is 26.19 kg/m².        12/12/2023:  RIGHT SHOULDER  Work injury  Feels the same as prior  MRI COMPLETED  He presented essentially pseudoparalytic in the right shoulder but fortunately clinically does appear to be somewhat better today.  MRI was reassuring as well because we were expecting possible large massive avulsion but he has a small partial bursal tear.  In light of these findings we counseled on operative and nonoperative measures I am very hopeful that he will respond to conservative course.  It can be frustrating and a work compensation scenario as the hope would be to restored to the prior preinjury state however in this case the partial tearing would likely not indicate a need for operative intervention although the partial tearing can persist.  The tearing is more intrasubstance and more medial rather than at the bone tendon interface.     Formal physical therapy is recommended at this time     [facility]     PRIOR COUNSELING:  Treatment Plan: PT Rx     Counseled on operative versus nonoperative measures.  Nonoperative measures recommended.   Small partial intrasubstance and bursal sided fraying of the rotator cuff.        Counseled on the importance of work providing him time to perform his physical therapy so that he can have ample time to strengthen the shoulder and to recover.     Work status:   Work status:   RIGHT ARM  10 lbs lifting restriction with the affected arm  No overhead lifting with the affected arm  No repetitive lift push pull with the affected arm           2/13/2024:  RIGHT SHOULDER  Improved/better  Almost 5 months post-injury  WORKERS' COMPENSATION INJURY  Employer: [employer]  Job at work: manual labor, heavy duty  Date of Injury at Work: 9/22/2023 -- approximately 6-7 weeks prior to my first evaluation  [facility] occupational medicine note from Dr. Lindsey reviewed from 10/19/2023      PT: Shirley Valle --- Constantine is helping him to make excellent gains at this time and he will continue to benefit from physical therapy services.     Improvements of the right shoulder noted he is no longer pseudoparalytic but still has impingement and pain with abduction still feels some weakness will continue to benefit from physical therapy      4/16/2024:  RIGHT SHOULDER  WORK INJURY  MRI re-reviewed today  Corresponded with Constantine Cintron, PT -- plateau'd with PT -- no longer see prior improvements, pain and dysfunction persist  Some anterior biceps pain -- SLAP tear on MRI  Rotator cuff tear  Failed MD guided Physical therapy    MRI Shoulder Right Without Contrast     Result Date: 12/11/2023  Impression: Irregular moderate-grade bursal-sided and interstitial tearing of the mid distal fibers of the supraspinatus tendon at and immediately proximal to the footprint. Focal tear of the posterior superior labrum. Electronically Signed: Pavan Mcnally MD  12/11/2023 10:31 AM EST  Workstation ID: [id number]    I personally reviewed and personally interpreted the imaging above.    Andrew contest in [place] - finals Elliott  2025        Subjective   Subjective     Review of Systems   Constitutional: Negative.  Negative for chills, fatigue and fever.   HENT: Negative.  Negative for congestion and dental problem.    Eyes: Negative.  Negative for blurred vision.   Respiratory: Negative.  Negative for shortness of breath.    Cardiovascular: Negative.  Negative for leg swelling.   Gastrointestinal: Negative.  Negative for abdominal pain.   Endocrine: Negative.  Negative for polyuria.   Genitourinary: Negative.  Negative for difficulty urinating.   Musculoskeletal:  Positive for arthralgias.   Skin: Negative.    Allergic/Immunologic: Negative.    Neurological: Negative.    Hematological: Negative.  Negative for adenopathy.   Psychiatric/Behavioral: Negative.  Negative for behavioral problems.         Past Medical History:   Past Medical History:   Diagnosis Date   • Rotator cuff syndrome        Past Surgical History:   Past Surgical History:   Procedure Laterality Date   • FRACTURE SURGERY Left 2001    left arm       Family History: History reviewed. No pertinent family history.    Social History:   Social History     Socioeconomic History   • Marital status: Single   Tobacco Use   • Smoking status: Every Day     Current packs/day: 2.00     Average packs/day: 2.0 packs/day for 29.3 years (58.5 ttl pk-yrs)     Types: Cigarettes     Start date: 1/9/1995     Passive exposure: Current   • Smokeless tobacco: Never   Vaping Use   • Vaping status: Never Used   Substance and Sexual Activity   • Alcohol use: Never   • Drug use: Never   • Sexual activity: Yes     Partners: Female     Birth control/protection: Condom       Medications:   Current Outpatient Medications:   •  ibuprofen (ADVIL,MOTRIN) 200 MG tablet, Take 1 tablet by mouth Every 6 (Six) Hours As Needed for Mild Pain., Disp: , Rfl:     Allergies: No Known Allergies    The following portions of the patient's history were reviewed and updated as appropriate: allergies, current medications,  "past family history, past medical history, past social history, past surgical history and problem list.        Objective    Objective     Vital Signs:   Vitals:    04/16/24 1409   BP: 132/88   Weight: 85.7 kg (189 lb)   Height: 180 cm (70.87\")       Ortho Exam:  RIGHT SHOULDER  Significant painful arc of motion.  Initially when he presented he was pseudoparalytic at this time he has dysrhythmic motion with forward flexion and abduction he can forward flex to be keeps his arm really close to his body/abducted he is starting to improve some abduction very difficult going overhead and difficulty extending posteriorly.    Anterior biceps pain with uppercut anterior biceps pain with speeds testing tenderness palpation about the biceps but primary pain generator appears to be the rotator cuff.  Positive Neer positive [place] positive Jobes testing pain and weakness with abduction resisted.  Poor arc of motion/dysrhythmic as noted    120/120/45/80/70 active and passive range of motion-pain limited.    Positive rotator cuff pathology and also positive biceps testing.        Results Review:  Imaging Results (Last 24 Hours)       ** No results found for the last 24 hours. **          MRI Shoulder Right Without Contrast     Result Date: 12/11/2023  Impression: Irregular moderate-grade bursal-sided and interstitial tearing of the mid distal fibers of the supraspinatus tendon at and immediately proximal to the footprint. Focal tear of the posterior superior labrum. Electronically Signed: Pavan Mcnally MD  12/11/2023 10:31 AM EST  Workstation ID: [id number]    I personally reviewed and personally interpreted the imaging above.      Procedures            Assessment / Plan      Assessment/Plan:   Problem List Items Addressed This Visit          Musculoskeletal and Injuries    Work related injury    Relevant Orders    External Facility Surgical/Procedural Request    Bursitis of right shoulder    Relevant Orders    External Facility " "Surgical/Procedural Request    Impingement syndrome of right shoulder    Relevant Orders    External Facility Surgical/Procedural Request    Traumatic incomplete tear of right rotator cuff - Primary    Relevant Orders    External Facility Surgical/Procedural Request    Biceps tendinitis of right upper extremity    Relevant Orders    External Facility Surgical/Procedural Request    Superior glenoid labrum lesion of right shoulder    Relevant Orders    External Facility Surgical/Procedural Request       Other    Rotator cuff injury, right, initial encounter    Relevant Orders    External Facility Surgical/Procedural Request    Pseudoparalysis    Relevant Orders    External Facility Surgical/Procedural Request     RIGHT SHOULDER    Risks and benefits of continued nonoperative management versus surgical management were discussed. The patient desires to proceed with operative intervention.    Rotator cuff repair with possible biceps tenodesis and subacromial decompression with acromioplasty as indicated. Sometimes the rotator cuff tear is not repairable and may require debridement or partial repair. The procedure is routinely done arthroscopically, but sometimes a larger incision needs to be made to complete the repair in an \"open\" fashion for rare cases.    Specific risks include pain, bleeding, infection, injury to surrounding nerve and blood vessels, fracture, failure or lack of healing of rotator cuff repair, incomplete pain relief, hardware failure, potential need for additional procedures, stiffness after surgery, possible biceps deformity, and potential inability to restore range of motion and strength. Medical, anesthetic, and block complications are possible.    General anesthesia is required, sling compliance, and compliance with physical therapy and/or a surgeon guided exercise program will be very important to the recovery process.    Opioid medications are utilized for a short course after surgery for pain " control.     RIGHT SHOULDER  Rotator cuff tear - Arthroscopic rotator cuff repair CPT code 39253  Biceps tendonitis and SLAP tear on MRI - Arthroscopic biceps tenodesis CPT code 17484 - POSSIBLE  Shoulder impingement syndrome    Counseled on irregular/atypical tear features of his rotator cuff with intrasubstance tearing and some medial based tearing of the tendon.    [facility]      Ultrasling III - a neutral rotation sling is recommended for this patient for perioperative care.  The patient was fitted for the sling and the sling was provided today.  The sling will be a critical part of the perioperative care for these diagnoses.      The patient desires to proceed with RIGHT shoulder surgery.         Diagnoses: as noted above     Mechanism of injury: Work injury     Treatment Plan: Patient desires to proceed with surgery     Work status:   RIGHT ARM  10 lbs lifting restriction with the affected arm  No overhead lifting with the affected arm  No repetitive lift push pull with the affected arm        Follow-up: Desires to proceed with surgery RIGHT SHOULDER        Isac Arrieta MD, FAAOS  Orthopedic Surgeon  Fellowship Trained Shoulder and Elbow Surgeon  [facility]  Orthopedics and Sports Medicine  [address] [place] [address], [address] [address]    04/16/24  15:55 EDT

## 2024-04-17 ENCOUNTER — TELEPHONE (OUTPATIENT)
Dept: PHYSICAL THERAPY | Facility: CLINIC | Age: 44
End: 2024-04-17

## 2024-04-17 NOTE — TELEPHONE ENCOUNTER
Caller: Heladio Guadalupe    Relationship: Self    What was the call regarding: PATIENT WILL NOT BE AT TODAY'S APPT. PATIENT'S ORTHO DR WILL BE DOING SURGERY SO NO MORE PT IS NEEDED UNTIL POST SURGERY

## 2024-05-02 ENCOUNTER — TELEPHONE (OUTPATIENT)
Dept: ORTHOPEDIC SURGERY | Facility: CLINIC | Age: 44
End: 2024-05-02
Payer: OTHER MISCELLANEOUS

## 2024-05-02 NOTE — TELEPHONE ENCOUNTER
CALLED AND LEFT A VOICEMAIL FOR PATIENT. WORK COMP APPROVAL FOR SURGERY HAD BEEN RECEIVED AND HE CAN CALL BACK TO GET SCHEDULED -[phone]- ANIA

## 2024-05-17 ENCOUNTER — OUTSIDE FACILITY SERVICE (OUTPATIENT)
Dept: ORTHOPEDIC SURGERY | Facility: CLINIC | Age: 44
End: 2024-05-17
Payer: OTHER MISCELLANEOUS

## 2024-05-17 ENCOUNTER — DOCUMENTATION (OUTPATIENT)
Dept: ORTHOPEDIC SURGERY | Facility: CLINIC | Age: 44
End: 2024-05-17

## 2024-05-17 DIAGNOSIS — M75.41 IMPINGEMENT SYNDROME OF RIGHT SHOULDER: ICD-10-CM

## 2024-05-17 DIAGNOSIS — R29.818 PSEUDOPARALYSIS: ICD-10-CM

## 2024-05-17 DIAGNOSIS — S46.011A TRAUMATIC INCOMPLETE TEAR OF RIGHT ROTATOR CUFF, INITIAL ENCOUNTER: ICD-10-CM

## 2024-05-17 DIAGNOSIS — S46.001A ROTATOR CUFF INJURY, RIGHT, INITIAL ENCOUNTER: ICD-10-CM

## 2024-05-17 DIAGNOSIS — S43.431D SUPERIOR GLENOID LABRUM LESION OF RIGHT SHOULDER, SUBSEQUENT ENCOUNTER: ICD-10-CM
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
## 2024-05-17 DIAGNOSIS — Z98.890 STATUS POST RIGHT ROTATOR CUFF REPAIR: Primary | ICD-10-CM

## 2024-05-17 DIAGNOSIS — M75.51 BURSITIS OF RIGHT SHOULDER: ICD-10-CM

## 2024-05-17 DIAGNOSIS — M75.21 BICEPS TENDINITIS OF RIGHT UPPER EXTREMITY: ICD-10-CM

## 2024-05-17 PROCEDURE — 29827 SHO ARTHRS SRG RT8TR CUF RPR: CPT | Performed by: ORTHOPAEDIC SURGERY

## 2024-05-17 PROCEDURE — 29826 SHO ARTHRS SRG DECOMPRESSION: CPT

## 2024-05-17 PROCEDURE — 29827 SHO ARTHRS SRG RT8TR CUF RPR: CPT

## 2024-05-17 PROCEDURE — 29826 SHO ARTHRS SRG DECOMPRESSION: CPT | Performed by: ORTHOPAEDIC SURGERY

## 2024-05-17 RX ORDER — METHOCARBAMOL 500 MG/1
500 TABLET, FILM COATED ORAL 3 TIMES DAILY PRN
Qty: 40 TABLET | Refills: 1 | Status: SHIPPED | OUTPATIENT
Start: 2024-05-17 | End: 2024-05-31

## 2024-05-17 RX ORDER — HYDROCODONE BITARTRATE AND ACETAMINOPHEN 10; 325 MG/1; MG/1
1 TABLET ORAL EVERY 6 HOURS PRN
Qty: 28 TABLET | Refills: 0 | Status: SHIPPED | OUTPATIENT
Start: 2024-05-17 | End: 2024-05-24

## 2024-05-17 RX ORDER — ONDANSETRON 4 MG/1
4 TABLET, FILM COATED ORAL EVERY 6 HOURS PRN
Qty: 30 TABLET | Refills: 1 | Status: SHIPPED | OUTPATIENT
Start: 2024-05-17 | End: 2024-05-25

## 2024-05-17 NOTE — PROGRESS NOTES
Operative Report     Side/SHOULDER: RIGHT SHOULDER    LOCATION:   [facility]  [address] [place] [address]    [facility] OPERATIVE REPORT    Surgeon: Isac Arrieta MD     Assistant: TIFFANIE Rene     The skilled assistance of the above noted first assistant was necessary during this complex surgical procedure.  The surgical assistant assisted with every aspect of the operation including, but not limited to, proper and safe positioning of the patient, obtaining adequate surgical exposure, manipulation of surgical instruments, suture management, surgical knot tying when necessary, the continual process of hemostasis during the procedure itself in addition to surgical wound closure and removal of the patient from the operating table and returning the patient back to the [facility].  The assistance of the surgical assistant allowed me to perform the most sensitive and technical potions of this operation using 2 hands, thus enhancing efficiency and patient safety.  This would not be possible without the help of a skilled assistant familiar with the procedure and capable of safely performing the aforementioned tasks.     Date of Surgery: 5/17/2024  Shoulder: Right shoulder   Preoperative Diagnosis:  1.     Rotator cuff tear - treated with arthroscopic rotator cuff repair - CPT 81389  2.     Shoulder impingement syndrome - treated with arthroscopic subacromial decompression with acromioplasty - CPT 69775     Postoperative Diagnosis:  1.     SAME as preoperative diagnoses     Procedure:  1.     Arthroscopic rotator cuff repair (1x1) - CPT 48679  2.     Arthroscopic subacromial decompression with acromioplasty - CPT 19975        Admission status: elective outpatient surgery    Complications: None     EBL: 10mL     Specimen: NONE     Anesthesia: general anesthesia     Block: regional interscalene block with single shot per  anesthesia     Antibiotics: weight based IV antibiotics infused prior to incision     Time out: Time out was called and the patient, side, site, and intended procedures were confirmed.     Counts: Needle and sponge counts were correct prior to closure.     DVT prophylaxis: The patient will be weight bearing as tolerated on bilateral lower extremities postoperatively with no additional chemical DVT prophylaxis indicated.     Marked: The patient was marked with an indelible marker in the preoperative holding area confirming the correct side and site.     History & Physical:   A history and physical examination was completed and updated in the preoperative holding area.     Consent: The patient signed the consent form for surgery with an understanding of the risks and benefits as outlined in the clinic and in the preoperative holding area.     Risks and Benefits: Specific risks and benefits discussed included - pain, bleeding, infection, injury to nerves or blood vessels, fracture, stiffness, failure to heal, revision surgery, deformity of biceps or asymmetry, complications of the block, anesthetic complications, and medical complications associated with surgery.       Indications for surgery:  The patient has history, physical examination, and radiographic findings confirming the diagnoses.  The patient has persistent pain and functional loss unresponsive to non-operative treatment consisting of selective rest/activity modification, medications, and exercises.  MRI documented the status of the rotator cuff - rotator cuff tearing was noted.     Impingement syndrome - the patient had history and clinical exam findings consistent with shoulder impingement syndrome.  Additionally, the patient had subacromial bursitis which was encountered during the arthroscopic shoulder procedure.  Subacromial decompression with minimal acromioplasty was indicated as part of the treatment of impingement syndrome, and additionally to  enhance arthroscopic visualization to enable minimally invasive, arthroscopic rotator cuff repair.       The patient suffered a work injury initially presented with a pseudoparalytic type picture fortunately the tearing was partial tearing perhaps with small amount of full-thickness extension and some degenerative labral fraying/SLAP tearing.  The motion did get better but he plateaued despite consistent physical therapy.  The injury was dated back to 9/22/2023 nearly 8 months prior.  Unfortunately he reached a plateau and continued to have symptoms.  He understands the significance of surgery and recovery time.       Operative Findings:  Rotator Cuff Tissue Quality: Partial tearing with full-thickness extension     Status of the Rotator Cuff:  Supraspinatus -partial tearing with full-thickness extension     Size of Rotator Cuff Tear:  Supraspinatus -9 to 10 mm     Rotator Cuff Repair Construct:  1x1 Transosseous Equivalent Double Row Arthroscopic Rotator Cuff Repair      Rotator Cuff Implants:  Medial Row: 1 Medial [place] - double loaded [employer] 4.5mm Healicoil PEEK   Lateral Row: 1 Lateral [place] - [employer] 5.5mm Footprint PEEK      Bone Quality: Solid bone quality, awl was used along with a tap for the medial row anchor and an awl was used for the lateral row anchor     Labrum: He had some mild fraying of the superior portion of the labrum, SLAP tearing but no indication for biceps tenodesis.  I had a good discussion with the patient in preop again and he was hopeful to avoid biceps tenotomy/tenodesis if the biceps was intact.  Fortunately the biceps was intact and well-appearing.  Mild degenerative fraying was debrided.     Humeral Head: Negative  Glenoid: Negative     Contracture: Negative  Pathological laxity: Negative     Procedure in detail:  Regional interscalene block was completed in the preoperative area by the anesthesia team.  The patient was supine on the operative table and the  anesthesia team initiated general anesthesia.  The patient was placed in a modified beach chair position with the neck secured in neutral alignment and all bony prominences were well padded.     The shoulder was assessed with an examination under anesthesia.     The operative extremity was cleaned with alcohol and then the extremity was prepped and draped in the standard fashion.  The surgical arm was placed into a well-padded arm isaac. A standard posterior portal was created with an incision made 1 cm inferior 1 cm medial to the posterolateral acromial corner.  A blunt metal trocar and cannula were inserted into the glenohumeral joint.  The arthroscopic pump was connected and the above findings and diagnoses were noted as part of the diagnostic shoulder arthroscopy.       A spinal needle was used to localize the anterior portal position in the rotator interval. A 5.5mm plastic cannula and trocar were inserted followed by a 4.5mm shaver/cautery device.  The shaver was used for debridement in the glenohumeral joint.  The biceps tendon was closely inspected and biceps tenodesis was not warranted.  Mild SLAP fraying noted superior portion labrum some anterior and this was debrided.  No indication for biceps tenodesis as the biceps tendon was intact.     The arthroscope and metal cannula were then removed in order to transition to the subacromial space.  The blunt metal trocar and cannula were inserted into the subacromial space and the lateral portal site identified with a spinal needle.  An 8 mm plastic cannula and trocar were inserted.  I turned my attention to the arthroscopic subacromial decompression with acromioplasty. Bursitis was noted in the subacromial space and impacted visualization of the rotator cuff.  The 4.5mm shaver/cautery device was used to clear the subacromial space until the acromion could be identified and bursal resection was completed to allow rotator cuff visualization.  The shaver was used  to remove fibrous tissue from the acromial undersurface until the anterolateral and anterior medial corners of the acromion were clearly identified.  The coracoacromial ligament was not released partial CA ligament tearing was noted and was debrided as part of subacromial decompression.  The shaver was used to perform a minimal acromioplasty.  The shaver/cautery device was used to perform the subacromial decompression with minimal acromioplasty.      I turned my attention to the rotator cuff tear and the arthroscopic rotator cuff repair.  The torn rotator cuff tendon was grasped with a handheld grasper and assessed for mobility and integrity.  The soft tissue at the greater tuberosity insertion site was removed and a power shaver was used to create a healthy bleeding bed to enhance rotator cuff tendon healing.     Arthroscopic rotator cuff suture anchors were then inserted for the rotator cuff repair.  The single medial row arthroscopic rotator cuff repair anchor was inserted and the sutures from the anchors were withdrawn through the anterior cannula. An arthroscopic suture passer was used to place the high strength sutures through the rotator cuff tendon in an inverted mattress fashion. The sutures were then inserted laterally into 1 lateral knotless anchor with appropriate tensioning.  The completed arthroscopic rotator cuff repair was inspected dynamically and the arthroscopic instruments removed along with the arthroscopic fluid. The incisions were closed with nylon suture and steri-strips were placed over the incisions.  A sterile dressing was applied followed by a neutral rotation sling.  The patient tolerated the procedure well and was transported to the recovery room in satisfactory condition.          Rotator Cuff Repair - POSTOPERATIVE PLAN:  Follow-up in the office in 2 weeks with 1 view of the operative shoulder at that time  Sutures: Nylon sutures to come out in 2 weeks  DVT prophylaxis: No chemical  DVT prophylaxis indicated  Weightbearing: Nonweightbearing on operative extremity, no biceps loading, pendulums/elbow/wrist/hand motion encouraged  Neutral rotation sling for 3 to 4 weeks following the surgery  Physical therapy: Formal outpatient physical therapy will be provided at the 2-week appointment in the office.  Rotator cuff repair protocol    Electronically signed by Isac Arrieta MD, 05/17/24, 9:53 AM EDT.

## 2024-05-20 DIAGNOSIS — Z98.890 STATUS POST RIGHT ROTATOR CUFF REPAIR: Primary | ICD-10-CM

## 2024-05-20 RX ORDER — IBUPROFEN 800 MG/1
800 TABLET ORAL EVERY 8 HOURS PRN
Qty: 90 TABLET | Refills: 1 | Status: SHIPPED | OUTPATIENT
Start: 2024-05-20

## 2024-06-04 ENCOUNTER — OFFICE VISIT (OUTPATIENT)
Age: 44
End: 2024-06-04
Payer: OTHER MISCELLANEOUS

## 2024-06-04 VITALS — TEMPERATURE: 97.8 F

## 2024-06-04 DIAGNOSIS — Z09 SURGERY FOLLOW-UP: Primary | ICD-10-CM

## 2024-06-04 DIAGNOSIS — Z98.890 S/P RIGHT ROTATOR CUFF REPAIR: ICD-10-CM

## 2024-06-04 PROCEDURE — 99024 POSTOP FOLLOW-UP VISIT: CPT

## 2024-06-04 NOTE — PROGRESS NOTES
[facility] [place] [facility] Office Follow Up       Office Follow Up Visit       Patient Name: Heladio Guadalupe    Chief Complaint:   Chief Complaint   Patient presents with   • Post-op     2 week s/p Arthroscopic right rotator cuff repair;Arthroscopic subacromial decompression with acromioplasty 5/17/24       Referring Physician: Isac Arrieta MD    History of Present Illness:   Heladio Guadalupe returns to clinic today for 2-week postop visit s/p right arthroscopic rotator cuff repair with Dr. Arrieta.  He reports to be doing well.  Pain has been controlled.  He is taking ibuprofen as needed.  He has been wearing his sling as instructed.    Work-related injury    RIGHT SHOULDER  WORK INJURY  Rotator cuff tear  Exhausted nonoperative treatment with PT  Saw Constantine Cintron at [facility]    Employer: [employer]  Job at work: manual labor, heavy duty  Date of Injury at Work: 9/22/2023    Mechanism of Injury at Work: He notes he was working with a grout pump hose that was clogged and 3 different times it jerked and finally his right arm gave out with acute pain to the shoulder     Procedure:  1.     Arthroscopic rotator cuff repair (1x1) - CPT 75159  2.     Arthroscopic subacromial decompression with acromioplasty - CPT 32107    Subjective     Review of Systems   Constitutional: Negative.  Negative for chills, fatigue and fever.   HENT: Negative.  Negative for congestion and dental problem.    Eyes: Negative.  Negative for blurred vision.   Respiratory: Negative.  Negative for shortness of breath.    Cardiovascular: Negative.  Negative for leg swelling.   Gastrointestinal: Negative.  Negative for abdominal pain.   Endocrine: Negative.  Negative for polyuria.   Genitourinary: Negative.  Negative for difficulty urinating.   Musculoskeletal:  Positive for arthralgias.   Skin: Negative.    Allergic/Immunologic: Negative.    Neurological: Negative.     Hematological: Negative.  Negative for adenopathy.   Psychiatric/Behavioral: Negative.  Negative for behavioral problems.         I have reviewed and updated the following portions of the patient's history and review of systems: allergies, current medications, past family history, past medical history, past social history, past surgical history and problem list.    Medications:   Current Outpatient Medications:   •  ibuprofen (ADVIL,MOTRIN) 800 MG tablet, Take 1 tablet by mouth Every 8 (Eight) Hours As Needed for Mild Pain., Disp: 90 tablet, Rfl: 1    Allergies: No Known Allergies      Objective      Vital Signs:   Vitals:    06/04/24 0836   Temp: 97.8 °F (36.6 °C)       Ortho Exam:  General: comfortable  Vascular: 2+ radial pulse  Neurologic: sensation to light touch is intact distally, elbow flexion/elbow extension/wrist flexion/wrist extension/hand intrinsics intact, able to fire deltoid; no residual defects noted from the block  Dermatologic: surgical incisions are well appearing, with no drainage or surrounding erythema; no signs or symptoms of infection or DVT      Results Review:  XR Shoulder 1 View Right  Imaging: shoulder x-rays 1 view - AP x-ray views    Side: RIGHT SHOULDER    Indication for shoulder x-ray 1 view: shoulder pain, postop evaluation   following surgery    Comparison: none    Findings: No acute bony pathology. Well appearing and well positioned   compared to preoperative imaging.  Located and no fracture noted.    I personally reviewed the above x-rays.       Assessment / Plan      Assessment:   Diagnoses and all orders for this visit:    1. Surgery follow-up (Primary)  -     XR Shoulder 1 View Right  -     Ambulatory Referral to Physical Therapy for Evaluation & Treatment    2. S/P right rotator cuff repair  -     Ambulatory Referral to Physical Therapy for Evaluation & Treatment        Quality Metrics:   BMI:   BMI is >= 25 and <30. (Overweight) The following options were offered after  discussion;: weight loss educational material (shared in after visit summary)       Tobacco:   Heladio Guadalupe  reports that he has been smoking cigarettes. He started smoking about 29 years ago. He has a 58.8 pack-year smoking history. He has been exposed to tobacco smoke. He has never used smokeless tobacco.        Plan:  2 weeks s/p right shoulder rotator cuff repair with Dr. Arrieta.  Intraoperative photographs reviewed today with the patient.  Pain has been well-controlled with ibuprofen.  May continue to take for pain as needed.  Continue sling use for 1 additional week.  May take breaks as needed for elbow/hand/wrist motion.  Remain nonweightbearing on right side.  Referral to physical therapy back at [facility] location. He will start right shoulder mobilization. No strengthening at this point. Protocol provided.  Sutures removed today.  He will remain off work until cleared. We discussed typical full recovery timeline approximately 6-9 months.    History, diagnosis and treatment plan discussed with Dr. Arrieta.      Follow Up:   6 weeks    Florinda Owen PA-C  [facility] [place] [facility]    Dictated using Dragon Speech Recognition.

## 2024-06-12 ENCOUNTER — TREATMENT (OUTPATIENT)
Dept: PHYSICAL THERAPY | Facility: CLINIC | Age: 44
End: 2024-06-12
Payer: OTHER MISCELLANEOUS

## 2024-06-12 DIAGNOSIS — R29.898 WEAKNESS OF RIGHT SHOULDER: ICD-10-CM

## 2024-06-12 DIAGNOSIS — Z98.890 S/P RIGHT ROTATOR CUFF REPAIR: ICD-10-CM

## 2024-06-12 DIAGNOSIS — M25.611 DECREASED RIGHT SHOULDER RANGE OF MOTION: Primary | ICD-10-CM

## 2024-06-12 NOTE — PROGRESS NOTES
Physical Therapy Initial Evaluation and Plan of Care    [facility]    [address], [address] [place] [address]    Patient: Heladio Guadalupe   : 1980  Diagnosis/ICD-10 Code:  Decreased right shoulder range of motion [M25.611]  Referring practitioner: Florinda Owen PA-C  Date of Initial Visit: 2024  Today's Date: 2024  Patient seen for 1 session         Visit Diagnoses:    ICD-10-CM ICD-9-CM   1. Decreased right shoulder range of motion  M25.611 719.51   2. Weakness of right shoulder  R29.898 781.99   3. S/P right rotator cuff repair  Z98.890 V45.89         Subjective Questionnaire: QuickDASH: 86.25%      Subjective Evaluation    History of Present Illness  Mechanism of injury: Heladio Guadalupe is a 43-year-old male that presents to the physical therapy clinic for initial evaluation following arthroscopic rotator cuff repair and arthroscopic subacromial decompression with acromioplasty on 24. Currently, he is not donned with neutral arm sling but notes that he has been compliant to regularly wearing as instructed; he took it off for therapy visit today. Pain levels today are moderate and they report continued use of NSAIDS for further pain management. Further details include: denies numbness/tingling or any complications following surgery.    Significant past medical history includes: following exhausted conservative course of care..    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          Patient Occupation: [employer]  Current pain rating: 3  At best pain rating: 3  At worst pain ratin  Relieving factors: relaxation, rest, support and medications  Exacerbated by: limited per precaution.    Hand dominance: right    Treatments  Previous treatment: physical therapy  Current treatment: physical therapy  Patient Goals  Patient goals for therapy: increased strength, decreased pain, increased motion, return to  sport/leisure activities, independence with ADLs/IADLs and return to work             Objective          Active Range of Motion     Additional Active Range of Motion Details  N/a  Mild limitations in elbow extension secondary to elbow flexor tightness; but <10%    Passive Range of Motion     Right Shoulder   Flexion: 135 degrees   Abduction: 115 degrees   External rotation 45°: 10 degrees     Strength/Myotome Testing     Additional Strength Details  Will be further assess in future sessions / unable to complete within IE secondary to precautions          Assessment & Plan       Assessment  Impairments: abnormal or restricted ROM, activity intolerance, impaired physical strength, lacks appropriate home exercise program, pain with function and weight-bearing intolerance   Functional limitations: carrying objects, lifting, sleeping, pulling, uncomfortable because of pain, moving in bed, reaching behind back, reaching overhead and unable to perform repetitive tasks   Assessment details: Upon initial physical therapy evaluation, the patient presents with acute right shoulder pain following arthroscopic right rotator cuff repair for large tear of supraspinatus without any noted complications upon initial evaluation. Patient presents with moderate pain levels. Patient with overall guarded position with increased IR following prolonged immobilization. Primary deficits noted during visit today include limited P/A ROM, shoulder weakness among other deficits secondary to surgery. These deficits are limiting his ability to perform ADLs/IADLs, functional mobility / transfers, work tasks, and recreational tasks.     Patient with good response to initial therapeutic treatment. Patient educated on transition to d/c of use of sling and normalized posture of arm at side. Verbalized understanding of plan of care and home exercise instructions.     The patient will benefit from skilled physical therapy services to address the listed  deficits for improved functional capacity and enhanced quality of life.      Barriers to therapy: history of smoking, future job demands  Prognosis: good    Goals  Plan Goals: SHORT TERM GOALS:     4 weeks  1. Pt I w/ HEP  2. Pt to demonstrate PROM of the right shoulder to 50-75% of normalized ranges to improve ability to perform ADL's  3. Pt to demonstrate appropriate scapular mechanics without cueing  4. Pt to improve by MCID of score on DASH to demonstrate decreased disability and enhanced QOL     LONG TERM GOALS:   8 weeks  1. Pt to demonstrate pain-free AROM of the right shoulder to % or normalized ranges   2. Pt to demonstrate flexion/abduction of shoulder without compensatory pattern of shoulder girdle mm  3. Pt able to lift 5# OH without onset of pain in shoulder and/or compensatory pattern  4. Pt to tolerate 60 minutes continuous activity in the clinic without increase in pain       Plan  Therapy options: will be seen for skilled therapy services  Planned modality interventions: cryotherapy, dry needling, electrical stimulation/Russian stimulation, thermotherapy (hydrocollator packs), TENS, microcurrent electrical stimulation, iontophoresis, traction and ultrasound  Planned therapy interventions: abdominal trunk stabilization, manual therapy, motor coordination training, ADL retraining, body mechanics training, neuromuscular re-education, postural training, soft tissue mobilization, spinal/joint mobilization, flexibility, fine motor coordination training, strengthening, functional ROM exercises, stretching, home exercise program, therapeutic activities and joint mobilization  Frequency: 2x week  Duration in weeks: 12  Treatment plan discussed with: patient        History # of Personal Factors and/or Comorbidities: LOW (0)  Examination of Body System(s): # of elements: LOW (1-2)  Clinical Presentation: STABLE   Clinical Decision Making: LOW       Timed:         Manual Therapy:         mins  24351;      Therapeutic Exercise:    20     mins  87033;     Neuromuscular Len:    15    mins  22110;    Therapeutic Activity:     10     mins  18647;     Gait Training:           mins  07182;     Ultrasound:          mins  83443;    Ionto                                   mins   08955  Self Care                            mins   24176  Canalith Repos         mins 51703      Un-Timed:  Electrical Stimulation:         mins  27105 ( );  Dry Needling          mins self-pay  Traction          mins 57329  Low Eval     15     Mins  55916  Mod Eval          Mins  65295  High Eval                            Mins  34693        Timed Treatment:   45   mins   Total Treatment:     60   mins    Next Visit:  Continue with PROM  Scapular activity      Visit and Documentation completed by Stephan Carlin PT, DPT   [place] License Number: [id number]    PT: Octavio Dyer PT, DPT, OCS, Cert. DN   License Number: [id number]  Electronically signed by Stephan Carlin PT, 06/12/24, 11:02 AM EDT    Certification Period: 6/12/2024 thru 9/9/2024  I certify that the therapy services are furnished while this patient is under my care.  The services outlined above are required by this patient, and will be reviewed every 90 days.         Physician Signature:__________________________________________________    PHYSICIAN: Florinda Owen PA-C  NPI: [id number]                                      DATE:      Please sign and return via fax to [email or web address] . Thank you, [facility].

## 2024-06-14 ENCOUNTER — TREATMENT (OUTPATIENT)
Dept: PHYSICAL THERAPY | Facility: CLINIC | Age: 44
End: 2024-06-14
Payer: OTHER MISCELLANEOUS

## 2024-06-14 DIAGNOSIS — M25.611 DECREASED RIGHT SHOULDER RANGE OF MOTION: Primary | ICD-10-CM

## 2024-06-14 DIAGNOSIS — Z98.890 S/P RIGHT ROTATOR CUFF REPAIR: ICD-10-CM

## 2024-06-14 DIAGNOSIS — R29.898 WEAKNESS OF RIGHT SHOULDER: ICD-10-CM

## 2024-06-17 NOTE — PROGRESS NOTES
Physical Therapy Daily Treatment Note    [facility]   [address], [address] [place] [address]      Patient: Heladio Guadalupe   : 1980  Referring practitioner: Florinda Owen PA-C  Date of Initial Visit: No linked episodes  Today's Date: 2024  Patient seen for Visit count could not be calculated. Make sure you are using a visit which is associated with an episode. sessions    Certification Period 2024 thru 2024       Visit Diagnoses:    ICD-10-CM ICD-9-CM   1. Decreased right shoulder range of motion  M25.611 719.51   2. Weakness of right shoulder  R29.898 781.99   3. S/P right rotator cuff repair  Z98.890 V45.89       Subjective     Heladio Guadalupe presents to the physical therapy clinic today reporting stable symptom pattern following their previous therapy visit. They state good compliance to home interventions. Other reports from patient during visit today include: tightness noted in wrist flexor mm group.    Objective   See Exercise, Manual, and Modality Logs for complete treatment.       Assessment/Plan     During physical therapy session, patient demonstrates good response to treatment, progress with functional activity, pain levels and progress towards therapy goals from previous visits. Progress specifically noted during today's session includes good compliance to ROM activity. Remaining deficits still noted during session today are shoulder weakness/limited AROM. Heladio Guadalupe will continue to benefit from skilled physical therapy services to address deficits and continue to work towards reaching functional goals. Changes made today were inclusion of putty gripping.       Next Visit:  Continue with PROM  Wrist flexor stretching      Timed:         Manual Therapy:         mins  67504;     Therapeutic Exercise:    30     mins  37747;     Neuromuscular Len:    10    mins  26282;    Therapeutic Activity:          mins  29241;     Gait Training:           mins  03087;      Ultrasound:          mins  48006;    Ionto                                   mins   10243  Self Care                            mins   27038  Canalith Repos         mins 26699  Electrical Stimulation:         mins  26821    Un-Timed:  Electrical Stimulation:         mins  90938 ( );  Dry Needling          mins self-pay  Traction          mins 26258      Timed Treatment:   40   mins   Total Treatment:     40   mins    Visit and Documentation completed by Stephan Carlin PT, DPT   [place] License Number: [id number]    Octavio Dyer PT, DPT, OCS, Cert. DN  [place] License: [id number]

## 2024-06-19 ENCOUNTER — TREATMENT (OUTPATIENT)
Dept: PHYSICAL THERAPY | Facility: CLINIC | Age: 44
End: 2024-06-19
Payer: OTHER MISCELLANEOUS

## 2024-06-19 DIAGNOSIS — Z98.890 S/P RIGHT ROTATOR CUFF REPAIR: ICD-10-CM

## 2024-06-19 DIAGNOSIS — R29.898 WEAKNESS OF RIGHT SHOULDER: ICD-10-CM

## 2024-06-19 DIAGNOSIS — M25.611 DECREASED RIGHT SHOULDER RANGE OF MOTION: Primary | ICD-10-CM

## 2024-06-21 ENCOUNTER — TREATMENT (OUTPATIENT)
Dept: PHYSICAL THERAPY | Facility: CLINIC | Age: 44
End: 2024-06-21
Payer: OTHER MISCELLANEOUS

## 2024-06-21 DIAGNOSIS — Z98.890 S/P RIGHT ROTATOR CUFF REPAIR: ICD-10-CM

## 2024-06-21 DIAGNOSIS — M25.611 DECREASED RIGHT SHOULDER RANGE OF MOTION: Primary | ICD-10-CM

## 2024-06-21 DIAGNOSIS — R29.898 WEAKNESS OF RIGHT SHOULDER: ICD-10-CM

## 2024-06-26 NOTE — PROGRESS NOTES
Physical Therapy Daily Treatment Note    [facility]   [address], [address] [place] [address]      Patient: Heladio Guadalupe   : 1980  Referring practitioner: Florinda Owen PA-C  Date of Initial Visit: Type: THERAPY  Noted: 2024  Today's Date: 2024  Patient seen for 2 sessions    Certification Period 2024 thru 2024       Visit Diagnoses:    ICD-10-CM ICD-9-CM   1. Decreased right shoulder range of motion  M25.611 719.51   2. Weakness of right shoulder  R29.898 781.99   3. S/P right rotator cuff repair  Z98.890 V45.89       Subjective     Pt states that he has some soreness in the shoudler at times and he continues to have some issues with sleeping, but he feels that he is doing better each week. He reports compliance with his HEP.     Objective   See Exercise, Manual, and Modality Logs for complete treatment.       Assessment/Plan     Pt is progressing well and he demonstrates an improvement in his right shoulder PROM in all planes. Will cont to progress as indicated per protocol. Heladio Guadalupe will continue to benefit from skilled physical therapy services to address deficits and continue to work towards reaching functional goals.           Timed:         Manual Therapy:    28     mins  79026;     Therapeutic Exercise:         mins  10045;     Neuromuscular Len:    20    mins  75085;    Therapeutic Activity:          mins  38585;     Gait Training:           mins  99042;     Ultrasound:          mins  93892;    Ionto                                   mins   39052  Self Care                            mins   72049  Canalith Repos         mins 44658  Electrical Stimulation:         mins  32623    Un-Timed:  Electrical Stimulation:         mins  13874 ( );  Dry Needling          mins self-pay  Traction          mins 43223      Timed Treatment:   48   mins   Total Treatment:     48   mins    Octavio Dyer, PT, DPT, Cert. DN  [place] License: [id number]

## 2024-06-27 ENCOUNTER — TREATMENT (OUTPATIENT)
Dept: PHYSICAL THERAPY | Facility: CLINIC | Age: 44
End: 2024-06-27
Payer: OTHER MISCELLANEOUS

## 2024-06-27 DIAGNOSIS — Z98.890 S/P RIGHT ROTATOR CUFF REPAIR: ICD-10-CM

## 2024-06-27 DIAGNOSIS — R29.898 WEAKNESS OF RIGHT SHOULDER: ICD-10-CM

## 2024-06-27 DIAGNOSIS — M25.611 DECREASED RIGHT SHOULDER RANGE OF MOTION: Primary | ICD-10-CM

## 2024-06-27 NOTE — PROGRESS NOTES
Physical Therapy Daily Treatment Note    [facility]   [address], [address] [place] [address]      Patient: Heladio Guadalupe   : 1980  Referring practitioner: Florinda Owen PA-C  Date of Initial Visit: Type: THERAPY  Noted: 2024  Today's Date: 2024  Patient seen for 3 sessions    Certification Period 2024 thru 2024       Visit Diagnoses:    ICD-10-CM ICD-9-CM   1. Decreased right shoulder range of motion  M25.611 719.51   2. Weakness of right shoulder  R29.898 781.99   3. S/P right rotator cuff repair  Z98.890 V45.89       Subjective     Patient states that he feels like his shoulder is doing well and he has minimal pain throughout most the day.  He does have some discomfort with movement of the right shoulder although he has been maintaining compliance with his restrictions.    Objective   See Exercise, Manual, and Modality Logs for complete treatment.       Assessment/Plan     Patient demonstrates improvement in his right shoulder passive range of motion in all planes.  Continue to progress activity in the clinic per protocol and will progress as indicated at his next visit. Heladio Guadalupe will continue to benefit from skilled physical therapy services to address deficits and continue to work towards reaching functional goals.           Timed:         Manual Therapy:    24     mins  01009;     Therapeutic Exercise:         mins  31471;     Neuromuscular Len:    20    mins  15320;    Therapeutic Activity:          mins  75719;     Gait Training:           mins  05128;     Ultrasound:          mins  88689;    Ionto                                   mins   04616  Self Care                            mins   46121  Canalith Repos         mins 05902  Electrical Stimulation:         mins  81298    Un-Timed:  Electrical Stimulation:         mins  56588 ( );  Dry Needling          mins self-pay  Traction          mins 22427      Timed Treatment:   44   mins   Total  Treatment:     44   mins    Octavio Dyer PT, DPT, Cert. DN  [place] License: [id number]

## 2024-06-27 NOTE — PROGRESS NOTES
Physical Therapy Daily Treatment Note    [facility]   [address], [address] [place] [address]      Patient: Heladio Guadalupe   : 1980  Referring practitioner: Florinda Owen PA-C  Date of Initial Visit: Type: THERAPY  Noted: 2024  Today's Date: 2024  Patient seen for 4 sessions    Certification Period 2024 thru 2024       Visit Diagnoses:    ICD-10-CM ICD-9-CM   1. Decreased right shoulder range of motion  M25.611 719.51   2. Weakness of right shoulder  R29.898 781.99   3. S/P right rotator cuff repair  Z98.890 V45.89       Subjective     Heladio Guadalupe presents to the physical therapy clinic today reporting stable symptom pattern following their previous therapy visit. They state good compliance to home interventions;. Other reports from patient during visit today include: throbbing soreness noted today in lateral shoulder / deltoid.    Objective   See Exercise, Manual, and Modality Logs for complete treatment.       Assessment/Plan     During physical therapy session, patient demonstrates good response to treatment, progress with functional activity, pain levels and progress towards therapy goals from previous visits. Progress specifically noted during today's session includes improvement in PROM/AAROM. Remaining deficits still noted during session today are pain in shoulder, stiffness and weakness secondary to surgery. Heladio Guadalupe will continue to benefit from skilled physical therapy services to address deficits and continue to work towards reaching functional goals. No changes to current PT POC, patient will continue AT.       Next Visit:  Progress isometrics  AAROM / PROM      Timed:         Manual Therapy:    8     mins  86760;     Therapeutic Exercise:    12     mins  35682;     Neuromuscular Len:    8    mins  99395;    Therapeutic Activity:     8     mins  01112;     Gait Training:           mins  33273;     Ultrasound:          mins  86274;    Ionto                                    mins   85679  Self Care                           mins   58260  Canalith Repos         mins 28155  Electrical Stimulation:         mins  28803    Un-Timed:  Electrical Stimulation:         mins  27191 ( );  Dry Needling          mins self-pay  Traction          mins 55642      Timed Treatment:   36   mins   Total Treatment:     36   mins    Visit and Documentation completed by Stephan Carlin PT,  YELENA SANCHES License Number: [id number]    Octavio Dyer PT, DPT, OCS, Cert. DN  [place] License: [id number]

## 2024-07-01 ENCOUNTER — TREATMENT (OUTPATIENT)
Dept: PHYSICAL THERAPY | Facility: CLINIC | Age: 44
End: 2024-07-01
Payer: OTHER MISCELLANEOUS

## 2024-07-01 DIAGNOSIS — M25.611 DECREASED RIGHT SHOULDER RANGE OF MOTION: Primary | ICD-10-CM

## 2024-07-01 DIAGNOSIS — R29.898 WEAKNESS OF RIGHT SHOULDER: ICD-10-CM

## 2024-07-01 DIAGNOSIS — Z98.890 S/P RIGHT ROTATOR CUFF REPAIR: ICD-10-CM

## 2024-07-01 NOTE — PROGRESS NOTES
Physical Therapy Daily Treatment Note    [place] [facility]   [address] [facility], [address] [place] [address]      Patient: Heladio Guadalupe   : 1980  Referring practitioner: Florinda Owen PA-C  Date of Initial Visit: Type: THERAPY  Noted: 2024  Today's Date: 2024  Patient seen for 5 sessions    Certification Period 2024 thru 2024       Visit Diagnoses:    ICD-10-CM ICD-9-CM   1. Decreased right shoulder range of motion  M25.611 719.51   2. Weakness of right shoulder  R29.898 781.99   3. S/P right rotator cuff repair  Z98.890 V45.89       Subjective     Heladio Guadalupe presents to the physical therapy clinic today reporting stable symptom pattern following their previous therapy visit. They state good compliance to home interventions; is completing all interventions as much as he can, especially wall walk and slides on table. Other reports from patient during visit today include: general discomfort and trouble sleeping still noted.     Objective   See Exercise, Manual, and Modality Logs for complete treatment.       Assessment/Plan     During physical therapy session, patient demonstrates good response to treatment, progress with functional activity, pain levels and progress towards therapy goals from previous visits. Progress specifically noted during today's session includes improvement in PROM in all directions. Remaining deficits still noted during session today are shoulder weakness and ROM deficits. Heladio Guadalupe will continue to benefit from skilled physical therapy services to address deficits and continue to work towards reaching functional goals. No changes to current PT POC, patient will continue AT.     Cueing provided throughout therapeutic session for appropriate scapular activation and postural mechanics during therapeutic interventions; fair carryover within session.        Next Visit:  Continue with isometrics / PROM  STM as needed      Timed:         Manual Therapy:     15     mins  74607;     Therapeutic Exercise:    10     mins  63385;     Neuromuscular Len:    8    mins  60534;    Therapeutic Activity:     10     mins  28237;     Gait Training:           mins  36513;     Ultrasound:          mins  07729;    Ionto                                   mins   27327  Self Care                            mins   24639  Canalith Repos         mins 73392  Electrical Stimulation:         mins  52123    Un-Timed:  Electrical Stimulation:         mins  24990 ( );  Dry Needling          mins self-pay  Traction          mins 11257      Timed Treatment:   43   mins   Total Treatment:     43   mins    Visit and Documentation completed by Stephan Carlin PT,  YELENA   [place] License Number: [id number]    Octavio Dyer PT, YELENA, OCS, Cert. DN  [place] License: [id number]

## 2024-07-05 ENCOUNTER — TREATMENT (OUTPATIENT)
Dept: PHYSICAL THERAPY | Facility: CLINIC | Age: 44
End: 2024-07-05
Payer: OTHER MISCELLANEOUS

## 2024-07-05 DIAGNOSIS — R29.898 WEAKNESS OF RIGHT SHOULDER: ICD-10-CM

## 2024-07-05 DIAGNOSIS — Z98.890 S/P RIGHT ROTATOR CUFF REPAIR: ICD-10-CM

## 2024-07-05 DIAGNOSIS — M25.611 DECREASED RIGHT SHOULDER RANGE OF MOTION: Primary | ICD-10-CM

## 2024-07-10 ENCOUNTER — TREATMENT (OUTPATIENT)
Dept: PHYSICAL THERAPY | Facility: CLINIC | Age: 44
End: 2024-07-10
Payer: OTHER MISCELLANEOUS

## 2024-07-10 DIAGNOSIS — M25.611 DECREASED RIGHT SHOULDER RANGE OF MOTION: Primary | ICD-10-CM

## 2024-07-10 DIAGNOSIS — R29.898 WEAKNESS OF RIGHT SHOULDER: ICD-10-CM

## 2024-07-10 DIAGNOSIS — Z98.890 S/P RIGHT ROTATOR CUFF REPAIR: ICD-10-CM

## 2024-07-10 NOTE — PROGRESS NOTES
Physical Therapy Daily Treatment Note    [facility]   [address] [facility], [address] [place] [address]      Patient: Heladio Guadalupe   : 1980  Referring practitioner: Florinda Owen PA-C  Date of Initial Visit: Type: THERAPY  Noted: 2024  Today's Date: 7/10/2024  Patient seen for 6 sessions    Certification Period 7/10/2024 thru 10/7/2024       Visit Diagnoses:    ICD-10-CM ICD-9-CM   1. Decreased right shoulder range of motion  M25.611 719.51   2. Weakness of right shoulder  R29.898 781.99   3. S/P right rotator cuff repair  Z98.890 V45.89       Subjective     Heladio Guadalupe presents to the physical therapy clinic today reporting stable symptom pattern following their previous therapy visit. They state good compliance to home interventions;. Other reports from patient during visit today include: little to no pain, with most soreness in pectoralis and posterior deltoid region.    Objective   See Exercise, Manual, and Modality Logs for complete treatment.       Assessment/Plan     During physical therapy session, patient demonstrates good response to treatment, progress with functional activity, pain levels and progress towards therapy goals from previous visits. Progress specifically noted during today's session includes good performance of passive mobility and table interventions following mild posterior glides of GH; IND with HEP interventions within session. Remaining deficits still noted during session today are shoulder weakness and limited AROM. Heladio Guadalupe will continue to benefit from skilled physical therapy services to address deficits and continue to work towards reaching functional goals. Changes made today were updated HEP.      Next Visit:  Continue with PROM  Assess HEP updates  Progress all interventions today as tolerated      Timed:         Manual Therapy:    10     mins  04310;     Therapeutic Exercise:    8     mins  10932;     Neuromuscular Len:    20    mins  95093;     Therapeutic Activity:     10     mins  98764;     Gait Training:           mins  26271;     Ultrasound:          mins  24684;    Ionto                                   mins   35419  Self Care                            mins   83910  Canalith Repos         mins 14357  Electrical Stimulation:         mins  80027    Un-Timed:  Electrical Stimulation:         mins  85690 ( );  Dry Needling          mins self-pay  Traction          mins 00809      Timed Treatment:   48   mins   Total Treatment:     48   mins    Visit and Documentation completed by Stephan Carlin PT,  YELENA   [place] License Number: [id number]    Octavio Dyer PT, YELENA, OCS, Cert. DN  [place] License: [id number]

## 2024-07-11 NOTE — PROGRESS NOTES
Advised to follow-up with ophthalmology.   Physical Therapy Daily Treatment Note    [facility]   [address] [facility], [address] [place] [address]      Patient: Heladio Guadalupe   : 1980  Referring practitioner: Florinda Owen PA-C  Date of Initial Visit: Type: THERAPY  Noted: 2024  Today's Date: 2024  Patient seen for 6 sessions    Certification Period 2024 thru 10/8/2024       Visit Diagnoses:    ICD-10-CM ICD-9-CM   1. Decreased right shoulder range of motion  M25.611 719.51   2. Weakness of right shoulder  R29.898 781.99   3. S/P right rotator cuff repair  Z98.890 V45.89       Subjective     Pt states that he has good and bad days, but overall the shoulder feels like it is improving and he has minimal pain at rest. He reports compliance with his HEP.     Objective   See Exercise, Manual, and Modality Logs for complete treatment.       Assessment/Plan     Pt is making steady progress and he demonstrates a steady improvement in his right shoulder PROM in all planes. He has been able to perform AAROM and initiate AROM exercise without any significant exacerbation of symptoms. Will cont to progress activity as indicated per protocol. Heladio Guadalupe will continue to benefit from skilled physical therapy services to address deficits and continue to work towards reaching functional goals.           Timed:         Manual Therapy:    18     mins  65241;     Therapeutic Exercise:    13     mins  44855;     Neuromuscular Len:    27    mins  88244;    Therapeutic Activity:          mins  00074;     Gait Training:           mins  50239;     Ultrasound:          mins  27673;    Ionto                                   mins   22621  Self Care                            mins   99267  Canalith Repos         mins 03375  Electrical Stimulation:         mins  41568    Un-Timed:  Electrical Stimulation:         mins  87362 ( );  Dry Needling          mins self-pay  Traction          mins 92757      Timed Treatment:   58   mins   Total  Treatment:     58   mins    Octavio Dyer, PT, DPT, Cert. DN  [place] License: [id number]

## 2024-07-12 ENCOUNTER — TREATMENT (OUTPATIENT)
Dept: PHYSICAL THERAPY | Facility: CLINIC | Age: 44
End: 2024-07-12
Payer: OTHER MISCELLANEOUS

## 2024-07-12 DIAGNOSIS — M25.611 DECREASED RIGHT SHOULDER RANGE OF MOTION: Primary | ICD-10-CM

## 2024-07-12 DIAGNOSIS — R29.898 WEAKNESS OF RIGHT SHOULDER: ICD-10-CM

## 2024-07-12 DIAGNOSIS — Z98.890 S/P RIGHT ROTATOR CUFF REPAIR: ICD-10-CM

## 2024-07-12 NOTE — PROGRESS NOTES
"Physical Therapy Daily Treatment Note    [facility]   [address] [facility], [address] [place] [address]      Patient: Heladio Guadalupe   : 1980  Referring practitioner: Florinda Owen PA-C  Date of Initial Visit: Type: THERAPY  Noted: 2024  Today's Date: 2024  Patient seen for 8 sessions    Certification Period 2024 thru 10/9/2024       Visit Diagnoses:    ICD-10-CM ICD-9-CM   1. Decreased right shoulder range of motion  M25.611 719.51   2. Weakness of right shoulder  R29.898 781.99   3. S/P right rotator cuff repair  Z98.890 V45.89       Subjective     Heladio Guadalupe presents to the physical therapy clinic today reporting stable symptom pattern following their previous therapy visit. They state good compliance to home interventions. Other reports from patient during visit today include: overall soreness and continued painful arc with \"catching\" pain at 90 deg elevation.     Objective   See Exercise, Manual, and Modality Logs for complete treatment.       Assessment/Plan     During physical therapy session, patient demonstrates fair response to treatment, progress with functional activity, pain levels and progress towards therapy goals from previous visits. Progress specifically noted during today's session includes tissue and joint stiffness still limiting motion and presentation with painful arc of motion within session; mildly improved with scapular postioning within session. Remaining deficits still noted during session today are limited AROM and shoulder weakness. Heladio Guadalupe will continue to benefit from skilled physical therapy services to address deficits and continue to work towards reaching functional goals. Changes made today were corner stretch.      Next Visit:  Abd   isos      Timed:         Manual Therapy:    10     mins  27332;     Therapeutic Exercise:    20     mins  54273;     Neuromuscular Len:    10    mins  20198;    Therapeutic Activity:     10     mins  " 89155;     Gait Training:           mins  98843;     Ultrasound:          mins  30615;    Ionto                                   mins   18115  Self Care                            mins   36682  Canalith Repos         mins 31497  Electrical Stimulation:         mins  36944    Un-Timed:  Electrical Stimulation:         mins  11699 ( );  Dry Needling          mins self-pay  Traction          mins 76562      Timed Treatment:  50    mins   Total Treatment:     50   mins    Visit and Documentation completed by Stephan Carlin PT,  YELENA   [place] License Number: [id number]    Octavio Dyer PT, DPT, OCS, Cert. DN  [place] License: [id number]

## 2024-07-16 ENCOUNTER — OFFICE VISIT (OUTPATIENT)
Age: 44
End: 2024-07-16
Payer: OTHER MISCELLANEOUS

## 2024-07-16 DIAGNOSIS — Z98.890 S/P RIGHT ROTATOR CUFF REPAIR: Primary | ICD-10-CM

## 2024-07-16 DIAGNOSIS — Z09 SURGERY FOLLOW-UP: ICD-10-CM

## 2024-07-16 PROCEDURE — 99024 POSTOP FOLLOW-UP VISIT: CPT

## 2024-07-16 RX ORDER — IBUPROFEN 800 MG/1
800 TABLET ORAL EVERY 8 HOURS PRN
Qty: 90 TABLET | Refills: 1 | Status: SHIPPED | OUTPATIENT
Start: 2024-07-16

## 2024-07-16 NOTE — PROGRESS NOTES
[facility] Follow Up       Office Follow Up Visit       Patient Name: Heladio Guadalupe    Chief Complaint:   Chief Complaint   Patient presents with    Post-op     6 week follow up; 2 months s/p Arthroscopic right rotator cuff repair;Arthroscopic subacromial decompression with acromioplasty 5/17/24       Referring Physician: Mukesh Lindsey MD    History of Present Illness:   Heladio Guadalupe returns to clinic today for postop visit 2 months s/p right arthroscopic rotator cuff repair, subacromial decompression with Dr. Arrieta.  He has been in physical therapy since last visit at [facility] location.  Sees Stephan Mcelory.  Seeing improvements with right shoulder range of motion.  Taking ibuprofen as needed.  Currently off work.    Work-related injury     RIGHT SHOULDER  WORK INJURY  Rotator cuff tear  Exhausted nonoperative treatment with PT  Saw Constantine Cintron at [facility] preoperatively-- now seeing Stephan Mcelroy     Employer: [employer]  Job at work: manual labor, heavy duty  Date of Injury at Work: 9/22/2023     Mechanism of Injury at Work: He notes he was working with a grout pump hose that was clogged and 3 different times it jerked and finally his right arm gave out with acute pain to the shoulder     Procedure:  1.     Arthroscopic rotator cuff repair (1x1) - CPT 18683  2.     Arthroscopic subacromial decompression with acromioplasty - CPT 94636    Subjective     Review of Systems   Constitutional: Negative.    HENT: Negative.     Eyes: Negative.    Respiratory: Negative.     Cardiovascular: Negative.    Gastrointestinal: Negative.    Endocrine: Negative.    Genitourinary: Negative.    Musculoskeletal:  Positive for arthralgias.   Skin: Negative.    Allergic/Immunologic: Negative.    Neurological: Negative.    Hematological: Negative.    Psychiatric/Behavioral: Negative.          I have reviewed and updated the  following portions of the patient's history and review of systems: allergies, current medications, past family history, past medical history, past social history, past surgical history and problem list.    Medications:   Current Outpatient Medications:     ibuprofen (ADVIL,MOTRIN) 800 MG tablet, Take 1 tablet by mouth Every 8 (Eight) Hours As Needed for Mild Pain., Disp: 90 tablet, Rfl: 1    Allergies: No Known Allergies      Objective      Vital Signs: There were no vitals filed for this visit.    Ortho Exam:  General: no acute distress, comfortable  Vitals reviewed in chart    Musculoskeletal Exam:    SIDE: Right shoulder  Incisions well healed    Passive range of motion smooth to 160 degrees of flexion  Progressing with active range of motion  No skin changes  No evidence of septic joint      Results Review:  XR Shoulder 1 View Right  Imaging: shoulder x-rays 1 view - AP x-ray views    Side: RIGHT SHOULDER    Indication for shoulder x-ray 1 view: shoulder pain, postop evaluation   following surgery    Comparison: none    Findings: No acute bony pathology. Well appearing and well positioned   compared to preoperative imaging.  Located and no fracture noted.    I personally reviewed the above x-rays.      Assessment / Plan      Assessment:   Diagnoses and all orders for this visit:    1. S/P right rotator cuff repair (Primary)  -     ibuprofen (ADVIL,MOTRIN) 800 MG tablet; Take 1 tablet by mouth Every 8 (Eight) Hours As Needed for Mild Pain.  Dispense: 90 tablet; Refill: 1  -     Ambulatory Referral to Physical Therapy for Evaluation & Treatment    2. Surgery follow-up  -     ibuprofen (ADVIL,MOTRIN) 800 MG tablet; Take 1 tablet by mouth Every 8 (Eight) Hours As Needed for Mild Pain.  Dispense: 90 tablet; Refill: 1  -     Ambulatory Referral to Physical Therapy for Evaluation & Treatment        Quality Metrics:   BMI:   BMI is >= 25 and <30. (Overweight) The following options were offered after discussion;: weight  loss educational material (shared in after visit summary)       Tobacco:   Heladio Guadalupe  reports that he has been smoking cigarettes. He started smoking about 29 years ago. He has a 59 pack-year smoking history. He has been exposed to tobacco smoke. He has never used smokeless tobacco.       Plan:  2 months s/p right rotator cuff repair, subacromial decompression with Dr. Arrieta.  I have encouraged continued physical therapy for right shoulder mobility.  May start gradual strengthening activity at 3 months from surgery. New order placed today.  We discussed other modalities at physical therapy including dry needling if his therapist feels it is indicated.  May consider on upper trap and periscapular areas.  Avoid directly at surgical site.  Prescription provided today for ibuprofen to take as needed.  Recommend off work until cleared. Hopeful to return next visit at light duty.    History, diagnosis and treatment plan discussed with Dr. Arrieta.    Follow Up:   6 weeks    Florinda Owen PA-C  [facility] [place] [facility]    Dictated using Dragon Speech Recognition.

## 2024-07-18 ENCOUNTER — TREATMENT (OUTPATIENT)
Dept: PHYSICAL THERAPY | Facility: CLINIC | Age: 44
End: 2024-07-18
Payer: OTHER MISCELLANEOUS

## 2024-07-18 DIAGNOSIS — Z98.890 S/P RIGHT ROTATOR CUFF REPAIR: ICD-10-CM

## 2024-07-18 DIAGNOSIS — M25.611 DECREASED RIGHT SHOULDER RANGE OF MOTION: Primary | ICD-10-CM

## 2024-07-18 DIAGNOSIS — R29.898 WEAKNESS OF RIGHT SHOULDER: ICD-10-CM

## 2024-07-25 ENCOUNTER — TREATMENT (OUTPATIENT)
Dept: PHYSICAL THERAPY | Facility: CLINIC | Age: 44
End: 2024-07-25
Payer: OTHER MISCELLANEOUS

## 2024-07-25 DIAGNOSIS — M25.611 DECREASED RIGHT SHOULDER RANGE OF MOTION: Primary | ICD-10-CM

## 2024-07-25 DIAGNOSIS — R29.898 WEAKNESS OF RIGHT SHOULDER: ICD-10-CM

## 2024-07-25 DIAGNOSIS — Z98.890 S/P RIGHT ROTATOR CUFF REPAIR: ICD-10-CM

## 2024-07-25 NOTE — PROGRESS NOTES
Physical Therapy Daily Treatment Note    [place] [facility]   [address] [address] [facility], [address] [place] [address]      Patient: Heladio Guadalupe   : 1980  Referring practitioner: Florinda Owen PA-C  Date of Initial Visit: Type: THERAPY  Noted: 2024  Today's Date: 2024  Patient seen for 9 sessions    Certification Period 2024 thru 10/22/2024       Visit Diagnoses:    ICD-10-CM ICD-9-CM   1. Decreased right shoulder range of motion  M25.611 719.51   2. Weakness of right shoulder  R29.898 781.99   3. S/P right rotator cuff repair  Z98.890 V45.89       Subjective     Patient states that he feels some occasional catching in the right shoulder when reaching to shoulder level and overhead, but he is not experiencing any significant increase in his pain level.  Patient reports compliance with his home exercise program.    Objective   See Exercise, Manual, and Modality Logs for complete treatment.       Assessment/Plan     Patient is able to perform activity in clinic without any significant exacerbation of symptoms in the right shoulder and he demonstrates an improvement in his overall strength and tolerance to activity.  Will continue to progress activity as indicated/per protocol.    Heladio Guadalupe will continue to benefit from skilled physical therapy services to address deficits and continue to work towards reaching functional goals.           Timed:         Manual Therapy:    24     mins  36890;     Therapeutic Exercise:    15     mins  74236;     Neuromuscular Len:    15    mins  64038;    Therapeutic Activity:          mins  64084;     Gait Training:           mins  43094;     Ultrasound:          mins  44123;    Ionto                                   mins   97540  Self Care                            mins   56295  Canalith Repos         mins 32780  Electrical Stimulation:         mins  18275    Un-Timed:  Electrical Stimulation:         mins  58225 ( );  Dry Needling          mins  self-pay  Traction          mins 68229      Timed Treatment:   54   mins   Total Treatment:     54   mins    Octavio Dyer PT, DPT, Cert. DN  [place] License: [id number]

## 2024-07-25 NOTE — PROGRESS NOTES
Physical Therapy Re Certification Of Plan of Care    [facility]   [address] [facility], [address] [place] [address]    Patient: Heladio Guadalupe   : 1980  Diagnosis/ICD-10 Code:  Decreased right shoulder range of motion [M25.611]  Referring practitioner: No ref. provider found  Date of Initial Visit: Type: THERAPY  Noted: 2024  Today's Date: 2024  Patient seen for 10 sessions         Visit Diagnoses:    ICD-10-CM ICD-9-CM   1. Decreased right shoulder range of motion  M25.611 719.51   2. Weakness of right shoulder  R29.898 781.99   3. S/P right rotator cuff repair  Z98.890 V45.89         Heladio Guadalupe reports that he feels like he has had some issues recently with catching in the front of the right shoulder when he elevates his arm to or slightly above shoulder level. He started to notice some difference in the catching yesterday and he did not have as intense pain in the shoulder with his usual daily activities. He has felt low on energy lately and he is still limited with his ability to use the arm for any lifting at this point.   Subjective Questionnaire: QuickDASH: 54.5  Clinical Progress: improved  Home Program Compliance: Yes  Treatment has included: therapeutic exercise, neuromuscular re-education, manual therapy, and therapeutic activity      Subjective       Objective          Palpation     Additional Palpation Details  Hypertonicity and TTP noted about the right deltoid and upper trap.     Active Range of Motion     Right Shoulder   Flexion: 157 (painful arc at *) degrees   Extension: 42 degrees   Abduction: 55 degrees   External rotation 0°: 52 degrees   Internal rotation BTB: L1     Additional Active Range of Motion Details  Early scapular rotation and elevation noted with shoulder abduction. Pt demonstrates improved control with flexion of the right UE.     Strength/Myotome Testing     Additional Strength Details  Not assessed at this time due to precautions.            Assessment/Plan    Pt has progressed very well so far in regards to his PROM in all planes. He demonstrates god AROM as well, augustine when given cues to maintain proper positioning of the scapula. Pt should be cleared to begin light RTC strengthening in the next couple of week and then we can begin progression to functional strengthening.      Recommendations: Continue as planned  Timeframe: 2 months  Prognosis to achieve goals: good      Timed:         Manual Therapy:    18     mins  82023;     Therapeutic Exercise:    14     mins  52675;     Neuromuscular Len:    24    mins  94547;    Therapeutic Activity:          mins  36254;     Gait Training:           mins  58518;     Ultrasound:          mins  42254;    Ionto                                   mins   69918  Self Care                            mins   36434  Canalith Repos        mins 43268      Un-Timed:  Electrical Stimulation:         mins  78670 ( );  Dry Needling          mins self-pay  Traction          mins 46153  Re-Eval                               mins  90498      Timed Treatment:   56   mins   Total Treatment:     56   mins          PT: Octavio Dyer PT, DPT, OCS, Cert. DN   [place] License:  [id number]    Electronically signed by Octavio Dyer PT, 07/25/24, 1:13 PM EDT    Certification Period: 7/25/2024 thru 10/22/2024  I certify that the therapy services are furnished while this patient is under my care.  The services outlined above are required by this patient, and will be reviewed every 90 days.         Physician Signature:__________________________________________________    PHYSICIAN:   NPI:                                       DATE:  :     Please sign and return via fax to [email or web address] . Thank you, [facility]

## 2024-07-30 ENCOUNTER — TREATMENT (OUTPATIENT)
Dept: PHYSICAL THERAPY | Facility: CLINIC | Age: 44
End: 2024-07-30
Payer: OTHER MISCELLANEOUS

## 2024-07-30 DIAGNOSIS — Z98.890 S/P RIGHT ROTATOR CUFF REPAIR: ICD-10-CM

## 2024-07-30 DIAGNOSIS — R29.898 WEAKNESS OF RIGHT SHOULDER: ICD-10-CM

## 2024-07-30 DIAGNOSIS — M25.611 DECREASED RIGHT SHOULDER RANGE OF MOTION: Primary | ICD-10-CM

## 2024-08-01 NOTE — PROGRESS NOTES
Physical Therapy Daily Treatment Note    [place] [facility]   [address] [facility], [address] [place] [address]      Patient: Heladio Guadalupe   : 1980  Referring practitioner: Isac Arrieta MD  Date of Initial Visit: Type: THERAPY  Noted: 2024  Today's Date: 2024  Patient seen for 11 sessions    Certification Period 2024 thru 10/28/2024       Visit Diagnoses:    ICD-10-CM ICD-9-CM   1. Decreased right shoulder range of motion  M25.611 719.51   2. Weakness of right shoulder  R29.898 781.99   3. S/P right rotator cuff repair  Z98.890 V45.89       Subjective     Pt states that he is feeling steady improvement with the shoulder and he continues to have minimal pain at rest, but some increase pain when moving the arm through a flexion ROM in a specific range and feels some mild popping as well.     Objective   See Exercise, Manual, and Modality Logs for complete treatment.       Assessment/Plan     Pt is progressing well and he was able to perform AROM exercise today without having a significant increase in pain. He had less popping and discomfort in the shoulder with repetition during activity.      Heladio Guadalupe will continue to benefit from skilled physical therapy services to address deficits and continue to work towards reaching functional goals.           Timed:         Manual Therapy:    18     mins  70227;     Therapeutic Exercise:    13     mins  26575;     Neuromuscular Len:    26    mins  24831;    Therapeutic Activity:          mins  68339;     Gait Training:           mins  42103;     Ultrasound:          mins  74017;    Ionto                                   mins   24462  Self Care                            mins   07702  Canalith Repos         mins 42677  Electrical Stimulation:         mins  71824    Un-Timed:  Electrical Stimulation:         mins  92472 ( );  Dry Needling          mins self-pay  Traction          mins 31631      Timed Treatment:   57   mins   Total  Treatment:     57   mins    Octavio Dyer, PT, DPT, Cert. DN  [place] License: [id number]

## 2024-08-08 ENCOUNTER — TREATMENT (OUTPATIENT)
Dept: PHYSICAL THERAPY | Facility: CLINIC | Age: 44
End: 2024-08-08
Payer: OTHER MISCELLANEOUS

## 2024-08-08 DIAGNOSIS — M25.611 DECREASED RIGHT SHOULDER RANGE OF MOTION: Primary | ICD-10-CM

## 2024-08-08 DIAGNOSIS — Z98.890 S/P RIGHT ROTATOR CUFF REPAIR: ICD-10-CM

## 2024-08-08 DIAGNOSIS — R29.898 WEAKNESS OF RIGHT SHOULDER: ICD-10-CM

## 2024-08-13 ENCOUNTER — TREATMENT (OUTPATIENT)
Dept: PHYSICAL THERAPY | Facility: CLINIC | Age: 44
End: 2024-08-13
Payer: OTHER MISCELLANEOUS

## 2024-08-13 DIAGNOSIS — M25.611 DECREASED RIGHT SHOULDER RANGE OF MOTION: Primary | ICD-10-CM

## 2024-08-13 DIAGNOSIS — R29.898 WEAKNESS OF RIGHT SHOULDER: ICD-10-CM

## 2024-08-13 DIAGNOSIS — Z98.890 S/P RIGHT ROTATOR CUFF REPAIR: ICD-10-CM

## 2024-08-15 ENCOUNTER — TREATMENT (OUTPATIENT)
Dept: PHYSICAL THERAPY | Facility: CLINIC | Age: 44
End: 2024-08-15
Payer: OTHER MISCELLANEOUS

## 2024-08-15 DIAGNOSIS — Z98.890 S/P RIGHT ROTATOR CUFF REPAIR: ICD-10-CM

## 2024-08-15 DIAGNOSIS — R29.898 WEAKNESS OF RIGHT SHOULDER: ICD-10-CM

## 2024-08-15 DIAGNOSIS — M25.611 DECREASED RIGHT SHOULDER RANGE OF MOTION: Primary | ICD-10-CM

## 2024-08-15 NOTE — PROGRESS NOTES
Physical Therapy Daily Treatment Note    [facility]   [address] [facility], [address] [place] [address]      Patient: Heladio Guadalupe   : 1980  Referring practitioner: Isac Arrieta MD  Date of Initial Visit: Type: THERAPY  Noted: 2024  Today's Date: 8/15/2024  Patient seen for 12 sessions    Certification Period 8/15/2024 thru 2024       Visit Diagnoses:    ICD-10-CM ICD-9-CM   1. Decreased right shoulder range of motion  M25.611 719.51   2. Weakness of right shoulder  R29.898 781.99   3. S/P right rotator cuff repair  Z98.890 V45.89       Subjective     Patient states that he feels that he is continue to make progress with his right shoulder although in the morning he has some clicking in the right shoulder when attempting to raise the arm but this decreases throughout the day and quickly dissipates after he is able to perform some stretching in the morning.    Objective   See Exercise, Manual, and Modality Logs for complete treatment.       Assessment/Plan     Patient is making steady progress with therapy and he continues to demonstrate improvement in his right shoulder passive range of motion in all planes.  He continues to have some slight catching at approximately 80 to 100 degrees forward flexion but the intensity of this seems to be decreasing over time.  Will continue to progress per protocol as indicated.    Heladio Guadalupe will continue to benefit from skilled physical therapy services to address deficits and continue to work towards reaching functional goals.           Timed:         Manual Therapy:    14     mins  42550;     Therapeutic Exercise:    15     mins  23007;     Neuromuscular Len:    30    mins  25022;    Therapeutic Activity:          mins  63733;     Gait Training:           mins  15782;     Ultrasound:          mins  84027;    Ionto                                   mins   30931  Self Care                            mins   39258  Canalith Repos         mins  34814  Electrical Stimulation:         mins  48067    Un-Timed:  Electrical Stimulation:         mins  23811 ( );  Dry Needling          mins self-pay  Traction          mins 29448      Timed Treatment:   59   mins   Total Treatment:     59   mins    Octavio Dyer PT, DPT, Cert. DN  [place] License: [id number]

## 2024-08-18 NOTE — PROGRESS NOTES
Physical Therapy Daily Treatment Note    [facility]   [address] [facility], [address] [place] [address]      Patient: Heladio Guadalupe   : 1980  Referring practitioner: Isac Arrieta MD  Date of Initial Visit: Type: THERAPY  Noted: 2024  Today's Date: 2024  Patient seen for 13 sessions    Certification Period 2024 thru 11/15/2024       Visit Diagnoses:    ICD-10-CM ICD-9-CM   1. Decreased right shoulder range of motion  M25.611 719.51   2. Weakness of right shoulder  R29.898 781.99   3. S/P right rotator cuff repair  Z98.890 V45.89       Subjective     Patient states that he feels like he is continue to make steady progress with the shoulder and he is able to do more activity throughout the day, but he continues to have some popping and slight discomfort in the shoulder usually in the mornings.  Patient reports compliance with his home exercise program and with restrictions.    Objective   See Exercise, Manual, and Modality Logs for complete treatment.       Assessment/Plan     Continue to progress activity in the clinic with a focus on improving patient's ability to perform functional activities with the right shoulder.  We are still protective of the right rotator cuff at this time and had not progressed rotator cuff strengthening significantly at this point but will likely begin to increase intensity of rotator cuff strengthening over the next few visits as indicated.    Heladio Guadalupe will continue to benefit from skilled physical therapy services to address deficits and continue to work towards reaching functional goals.           Timed:         Manual Therapy:    18     mins  89356;     Therapeutic Exercise:    24     mins  61084;     Neuromuscular Len:    12    mins  70408;    Therapeutic Activity:          mins  96515;     Gait Training:           mins  03100;     Ultrasound:          mins  64933;    Ionto                                   mins   15755  Self Care                             mins   98464  Canalith Repos         mins 62684  Electrical Stimulation:         mins  79151    Un-Timed:  Electrical Stimulation:         mins  44701 ( );  Dry Needling          mins self-pay  Traction          mins 23689      Timed Treatment:   54   mins   Total Treatment:     54   mins    Octavio Dyer PT, DPT, Cert. DN  [place] License: [id number]

## 2024-08-20 ENCOUNTER — TREATMENT (OUTPATIENT)
Dept: PHYSICAL THERAPY | Facility: CLINIC | Age: 44
End: 2024-08-20
Payer: OTHER MISCELLANEOUS

## 2024-08-20 DIAGNOSIS — R29.898 WEAKNESS OF RIGHT SHOULDER: ICD-10-CM

## 2024-08-20 DIAGNOSIS — M25.611 DECREASED RIGHT SHOULDER RANGE OF MOTION: Primary | ICD-10-CM

## 2024-08-20 DIAGNOSIS — Z98.890 S/P RIGHT ROTATOR CUFF REPAIR: ICD-10-CM

## 2024-08-22 ENCOUNTER — TREATMENT (OUTPATIENT)
Dept: PHYSICAL THERAPY | Facility: CLINIC | Age: 44
End: 2024-08-22
Payer: OTHER MISCELLANEOUS

## 2024-08-22 DIAGNOSIS — R29.898 WEAKNESS OF RIGHT SHOULDER: ICD-10-CM

## 2024-08-22 DIAGNOSIS — M25.611 DECREASED RIGHT SHOULDER RANGE OF MOTION: Primary | ICD-10-CM

## 2024-08-22 DIAGNOSIS — Z98.890 S/P RIGHT ROTATOR CUFF REPAIR: ICD-10-CM

## 2024-08-22 NOTE — PROGRESS NOTES
Physical Therapy Daily Progress Note    Subjective   Heladio Guadalupe reports: his shoulder feels better when he pops it. He feels about 50% better since starting PT.       Objective   See Exercise, Manual, and Modality Logs for complete treatment.       Assessment/Plan  Pt tolerated treatment well. He had catching and pain with weighted shoulder flexion, which improved with less weight. He gets relief from inferior GH glides. He is progressing well and would benefit from continued skilled PT.    Progress per Plan of Care           Manual Therapy:    14     mins  02207;  Therapeutic Exercise:    23     mins  20844;     Neuromuscular Len:    15    mins  70707;    Therapeutic Activity:          mins  02167;     Gait Training:           mins  64612;     Ultrasound:          mins  24937;    Electrical Stimulation:         mins  39019 ( );  E-Stim Attended:         mins  55570  Iontophoresis          mins 55335   Traction          mins  27073  Fluidotherapy          mins  56318  Dry Needling          mins self-pay - No Charge  Paraffin          mins  19071    Timed Treatment:   52   mins   Total Treatment:     52   mins    Tana Cintron, PT, DPT  Physical Therapist

## 2024-08-23 NOTE — PROGRESS NOTES
Physical Therapy Daily Treatment Note    [facility]   [address] [facility], [address] [place] [address]      Patient: Heladio Guadalupe   : 1980  Referring practitioner: Isac Arrieta MD  Date of Initial Visit: Type: THERAPY  Noted: 2024  Today's Date: 2024  Patient seen for 14 sessions    Certification Period 2024 thru 2024       Visit Diagnoses:    ICD-10-CM ICD-9-CM   1. Decreased right shoulder range of motion  M25.611 719.51   2. Weakness of right shoulder  R29.898 781.99   3. S/P right rotator cuff repair  Z98.890 V45.89       Subjective     Pt states that he is feeling improvement in his shoulder over the past several weeks and he is able to do more with the arm without having excessive fatigue. He still has complaints of catching at the front of the right shoulder when he initially begins to move his arm in the morning but this resolves fairly quickly and does not cause any excessive amount of pain.    Objective   See Exercise, Manual, and Modality Logs for complete treatment.       Assessment/Plan     Patient continues to make steady progress with therapy and he demonstrates an improvement in his overall tolerance to activity and strength in the clinic.  Will continue to progress activity as indicated with a focus on improving patient's ability to perform all functional activities and to facilitate a safe return to work.    Heladio Guadalupe will continue to benefit from skilled physical therapy services to address deficits and continue to work towards reaching functional goals.           Timed:         Manual Therapy:    13     mins  95129;     Therapeutic Exercise:    10     mins  08152;     Neuromuscular Len:    30    mins  46606;    Therapeutic Activity:          mins  69769;     Gait Training:           mins  96523;     Ultrasound:          mins  29255;    Ionto                                   mins   32504  Self Care                            mins    93181  [facility]         mins 02342  Electrical Stimulation:         mins  04154    Un-Timed:  Electrical Stimulation:         mins  86578 ( );  Dry Needling          mins self-pay  Traction          mins 74919      Timed Treatment:   53   mins   Total Treatment:     53   mins    Octavio Dyer PT, DPT, Cert. DN  [place] License: [id number]

## 2024-08-26 ENCOUNTER — TREATMENT (OUTPATIENT)
Dept: PHYSICAL THERAPY | Facility: CLINIC | Age: 44
End: 2024-08-26
Payer: OTHER MISCELLANEOUS

## 2024-08-26 DIAGNOSIS — R29.898 WEAKNESS OF RIGHT SHOULDER: ICD-10-CM

## 2024-08-26 DIAGNOSIS — Z98.890 S/P RIGHT ROTATOR CUFF REPAIR: ICD-10-CM

## 2024-08-26 DIAGNOSIS — M25.611 DECREASED RIGHT SHOULDER RANGE OF MOTION: Primary | ICD-10-CM

## 2024-08-27 ENCOUNTER — OFFICE VISIT (OUTPATIENT)
Age: 44
End: 2024-08-27
Payer: OTHER MISCELLANEOUS

## 2024-08-27 VITALS
SYSTOLIC BLOOD PRESSURE: 130 MMHG | HEIGHT: 71 IN | DIASTOLIC BLOOD PRESSURE: 88 MMHG | BODY MASS INDEX: 28.78 KG/M2 | WEIGHT: 205.6 LBS
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
## 2024-08-27 DIAGNOSIS — Z98.890 S/P RIGHT ROTATOR CUFF REPAIR: Primary | ICD-10-CM

## 2024-08-27 PROCEDURE — 99213 OFFICE O/P EST LOW 20 MIN: CPT

## 2024-08-27 NOTE — PROGRESS NOTES
[facility] Office Follow Up       Office Follow Up Visit       Patient Name: Heladio Guadalupe    Chief Complaint:   Chief Complaint   Patient presents with   • Follow-up     6 week follow up-  3.5 months s/p Arthroscopic right rotator cuff repair;Arthroscopic subacromial decompression with acromioplasty 5/17/24       Referring Physician: No ref. provider found    History of Present Illness:   Heladio Guadalupe returns to clinic today for follow-up 3 months s/p right shoulder rotator cuff repair, subacromial decompression with Dr. Arrieta.  Last visit on 7/16/2024.  He continues to see some improvements with therapy.  Range of motion gradually getting better.  He has recently started some strengthening. He goes to [facility]. Off work currently.  Work-related injury     RIGHT SHOULDER  WORK INJURY  Rotator cuff tear  Exhausted nonoperative treatment with PT     Employer: [employer]  Job at work: manual labor, heavy duty  Date of Injury at Work: 9/22/2023     Mechanism of Injury at Work: He notes he was working with a grout pump hose that was clogged and 3 different times it jerked and finally his right arm gave out with acute pain to the shoulder     Procedure:  1.     Arthroscopic rotator cuff repair (1x1) - CPT 69439  2.     Arthroscopic subacromial decompression with acromioplasty - CPT 72650       Subjective     Review of Systems   Constitutional: Negative.  Negative for chills, fatigue and fever.   HENT: Negative.  Negative for congestion and dental problem.    Eyes: Negative.  Negative for blurred vision.   Respiratory: Negative.  Negative for shortness of breath.    Cardiovascular: Negative.  Negative for leg swelling.   Gastrointestinal: Negative.  Negative for abdominal pain.   Endocrine: Negative.  Negative for polyuria.   Genitourinary: Negative.  Negative for difficulty urinating.   Musculoskeletal:  Positive for  "arthralgias.   Skin: Negative.    Allergic/Immunologic: Negative.    Neurological: Negative.    Hematological: Negative.  Negative for adenopathy.   Psychiatric/Behavioral: Negative.  Negative for behavioral problems.         I have reviewed and updated the following portions of the patient's history and review of systems: allergies, current medications, past family history, past medical history, past social history, past surgical history and problem list.    Medications:   Current Outpatient Medications:   •  ibuprofen (ADVIL,MOTRIN) 800 MG tablet, Take 1 tablet by mouth Every 8 (Eight) Hours As Needed for Mild Pain., Disp: 90 tablet, Rfl: 1    Allergies: No Known Allergies      Objective      Vital Signs:   Vitals:    08/27/24 0800   BP: 130/88   Weight: 93.3 kg (205 lb 9.6 oz)   Height: 180 cm (70.87\")       Ortho Exam:  General: no acute distress, comfortable  Vitals reviewed in chart    Musculoskeletal Exam:    SIDE: Right shoulder    Tenderness: No focal tenderness  Negative lag sign    Satisfactory passive range of motion improving active range of motion. Near full range of motion with forward flexion actively. He does have some dysfunction at approximately 90 degrees of abduction secondary to strength deficit.  No evidence of septic joint      Results Review:  XR Shoulder 1 View Right  Imaging: shoulder x-rays 1 view - AP x-ray views    Side: RIGHT SHOULDER    Indication for shoulder x-ray 1 view: shoulder pain, postop evaluation   following surgery    Comparison: none    Findings: No acute bony pathology. Well appearing and well positioned   compared to preoperative imaging.  Located and no fracture noted.    I personally reviewed the above x-rays.     I have noted results reviewed from previous encounter.      Assessment / Plan      Assessment:   Diagnoses and all orders for this visit:    1. S/P right rotator cuff repair (Primary)  -     Ambulatory Referral to Physical Therapy for Evaluation & " Treatment    2. Work related injury  -     Ambulatory Referral to Physical Therapy for Evaluation & Treatment        Quality Metrics:   BMI:   BMI is >= 25 and <30. (Overweight) The following options were offered after discussion;: weight loss educational material (shared in after visit summary)       Tobacco:   Heladio Guadalupe  reports that he has been smoking cigarettes. He started smoking about 29 years ago. He has a 59.3 pack-year smoking history. He has been exposed to tobacco smoke. He has never used smokeless tobacco.        Plan:  He is 3 months s/p right shoulder rotator cuff repair with Dr. Arrieta.  Seeing gradual improvements with right shoulder range of motion and strength.  We discussed continued immobilization with physical therapy.  May continue gradual strengthening as tolerated.  New order provided today.  Recommend return to work with light duty restrictions.  No lifting overhead, no more than 5 pounds to chest height and no repetitive push/pull.  May take over-the-counter anti-inflammatories as needed.    History, diagnosis and treatment plan discussed with Dr. Arrieta.    Follow Up:   6 weeks    Florinda Owen PA-C  [facility]    Dictated using Dragon Speech Recognition.

## 2024-08-27 NOTE — PROGRESS NOTES
Physical Therapy Daily Treatment Note    [facility]   [address] [facility], [address] [place] [address]      Patient: Heladio Guadalupe   : 1980  Referring practitioner: sIac Arrieta MD  Date of Initial Visit: Type: THERAPY  Noted: 2024  Today's Date: 2024  Patient seen for 15 sessions    Certification Period 2024 thru 2024       Visit Diagnoses:    ICD-10-CM ICD-9-CM   1. Decreased right shoulder range of motion  M25.611 719.51   2. S/P right rotator cuff repair  Z98.890 V45.89   3. Weakness of right shoulder  R29.898 781.99       Subjective     Patient states that he feels like he is able to do more with his arm throughout the day and he has less discomfort in the arm with performing light functional activities at home.  He continues to have some catching in the right shoulder especially in the morning and occasional popping in the right shoulder, although after his right shoulder pops he tends to have a decrease in discomfort in the right shoulder and feels that his range of motion instantly improves as well.    Objective   See Exercise, Manual, and Modality Logs for complete treatment.       Assessment/Plan     Patient demonstrates nearly full passive range of motion of the right shoulder in all planes, although he has some discomfort endrange shoulder external rotation passively.  Patient demonstrates good active range of motion of the right shoulder as well, although he has some limitation and achieving full range of motion actively and he has a catch in the right shoulder when moving the arm and forward flexion from approximately 80 to 100 degrees.  The catching in the right shoulder is not concerning at this time and is likely indicative of remaining rotator cuff strength deficits.  Will continue to progress rotator cuff strengthening as indicated per protocol.    Heladio Guadalupe will continue to benefit from skilled physical therapy services to address deficits and  continue to work towards reaching functional goals.           Timed:         Manual Therapy:    18     mins  44331;     Therapeutic Exercise:    13     mins  48445;     Neuromuscular Len:    30    mins  83315;    Therapeutic Activity:          mins  18746;     Gait Training:           mins  18772;     Ultrasound:          mins  38987;    Ionto                                   mins   66072  Self Care                            mins   62461  Canalith Repos         mins 56195  Electrical Stimulation:         mins  36043    Un-Timed:  Electrical Stimulation:         mins  63340 ( );  Dry Needling          mins self-pay  Traction          mins 76025      Timed Treatment:   61   mins   Total Treatment:     61   mins    Octavio Dyer PT, DPT, Cert. DN  [place] License: [id number]

## 2024-08-28 ENCOUNTER — TREATMENT (OUTPATIENT)
Dept: PHYSICAL THERAPY | Facility: CLINIC | Age: 44
End: 2024-08-28
Payer: OTHER MISCELLANEOUS

## 2024-08-28 DIAGNOSIS — M25.611 DECREASED RIGHT SHOULDER RANGE OF MOTION: Primary | ICD-10-CM

## 2024-08-28 DIAGNOSIS — Z98.890 S/P RIGHT ROTATOR CUFF REPAIR: ICD-10-CM

## 2024-08-28 DIAGNOSIS — R29.898 WEAKNESS OF RIGHT SHOULDER: ICD-10-CM

## 2024-08-28 NOTE — PROGRESS NOTES
Physical Therapy Daily Treatment Note    [facility]   [address] [facility], [address] [place] [address]      Patient: Heladio Guadalupe   : 1980  Referring practitioner: Isac Arrieta MD  Date of Initial Visit: Type: THERAPY  Noted: 2024  Today's Date: 2024  Patient seen for 17 sessions    Certification Period 2024 thru 2024       Visit Diagnoses:    ICD-10-CM ICD-9-CM   1. Decreased right shoulder range of motion  M25.611 719.51   2. Weakness of right shoulder  R29.898 781.99   3. S/P right rotator cuff repair  Z98.890 V45.89       Subjective     Patient states that he feels that his strength is beginning to improve and he is able to do more at the shoulder throughout the day but he continues to have some intermittent catching in the shoulder when raising his arm.    Objective   See Exercise, Manual, and Modality Logs for complete treatment.       Assessment/Plan     Patient is progressing well and demonstrates an improvement in his overall ability to perform resisted activity in the clinic.  Patient demonstrates excellent passive range of motion of the right shoulder in all planes and he shows incremental improvement and the quality of active range of motion as well.  Will continue to progress as indicated.    Heladio Guadalupe will continue to benefit from skilled physical therapy services to address deficits and continue to work towards reaching functional goals.           Timed:         Manual Therapy:    14     mins  26537;     Therapeutic Exercise:    10     mins  21395;     Neuromuscular Len:    30    mins  93698;    Therapeutic Activity:          mins  85972;     Gait Training:           mins  15167;     Ultrasound:          mins  77974;    Ionto                                   mins   07183  Self Care                            mins   58810  Canalith Repos         mins 35924  Electrical Stimulation:         mins  07495    Un-Timed:  Electrical Stimulation:         mins   03913 ( );  Dry Needling          mins self-pay  Traction          mins 93530      Timed Treatment:   54   mins   Total Treatment:     54   mins    Octavio Dyer PT, DPT, Cert. DN  [place] License: [id number]

## 2024-08-29 NOTE — PROGRESS NOTES
Physical Therapy Daily Treatment Note    [facility]   [address] [facility], [address] [place] [address]      Patient: Heladio Guadalupe   : 1980  Referring practitioner: Isac Arrieta MD  Date of Initial Visit: Type: THERAPY  Noted: 2024  Today's Date: 2024  Patient seen for 18 sessions    Certification Period 2024 thru 2024       Visit Diagnoses:    ICD-10-CM ICD-9-CM   1. Decreased right shoulder range of motion  M25.611 719.51   2. S/P right rotator cuff repair  Z98.890 V45.89   3. Weakness of right shoulder  R29.898 781.99       Subjective     Patient states that he feels that his right shoulder is continuing to improve and he feels that his strength has improved over the past several weeks as well.  He continues to have some complaints of popping in the right shoulder and has some concerned about this popping at times although the popping in his shoulder has not been painful or limiting with activity.    Objective   See Exercise, Manual, and Modality Logs for complete treatment.       Assessment/Plan     Patient continues to progress very well with therapy and he demonstrates an improvement in his right shoulder active range of motion in all planes in his ability to perform resisted activity without exacerbation of symptoms.  Continue to progress rotator cuff strengthening as indicated with a focus on slow controlled movements and proper scapular control.    Heladio Guadalupe will continue to benefit from skilled physical therapy services to address deficits and continue to work towards reaching functional goals.           Timed:         Manual Therapy:    14     mins  36025;     Therapeutic Exercise:    16     mins  14355;     Neuromuscular Len:    25    mins  32416;    Therapeutic Activity:          mins  25100;     Gait Training:           mins  82810;     Ultrasound:          mins  14412;    Ionto                                   mins   97479  Self Care                             mins   12002  Canalith Repos         mins 99682  Electrical Stimulation:         mins  69929    Un-Timed:  Electrical Stimulation:         mins  20262 ( );  Dry Needling          mins self-pay  Traction          mins 57079      Timed Treatment:   55   mins   Total Treatment:     55   mins    Octavio Dyer PT, DPT, Cert. DN  [place] License: [id number]

## 2024-09-06 ENCOUNTER — TREATMENT (OUTPATIENT)
Dept: PHYSICAL THERAPY | Facility: CLINIC | Age: 44
End: 2024-09-06
Payer: OTHER MISCELLANEOUS

## 2024-09-06 DIAGNOSIS — R29.898 WEAKNESS OF RIGHT SHOULDER: ICD-10-CM

## 2024-09-06 DIAGNOSIS — M25.611 DECREASED RIGHT SHOULDER RANGE OF MOTION: Primary | ICD-10-CM

## 2024-09-06 DIAGNOSIS — Z98.890 S/P RIGHT ROTATOR CUFF REPAIR: ICD-10-CM

## 2024-09-06 NOTE — PROGRESS NOTES
Physical Therapy Daily Treatment Note    [facility]   [address] [facility], [address] [place] [address]      Patient: Heladio Guadalupe   : 1980  Referring practitioner: Isac Arrieta MD  Date of Initial Visit: Type: THERAPY  Noted: 2024  Today's Date: 2024  Patient seen for 19 sessions    Certification Period 2024 thru 2024       Visit Diagnoses:    ICD-10-CM ICD-9-CM   1. Decreased right shoulder range of motion  M25.611 719.51   2. S/P right rotator cuff repair  Z98.890 V45.89   3. Weakness of right shoulder  R29.898 781.99       Subjective     Heladio Guadalupe presents to the physical therapy clinic today reporting stable symptom pattern following their previous therapy visit. They state good compliance to home interventions; continues with all interventions without questions or concerns\. Other reports from patient during visit today include: Reports that he was fishing at home and asked step-off that was painless feels that his range of motion has improved.    Objective   See Exercise, Manual, and Modality Logs for complete treatment.       Assessment/Plan     During physical therapy session, patient demonstrates good response to treatment, progress with functional activity, pain levels and progress towards therapy goals from previous visits. Progress specifically noted during today's session includes improvement and active range of motion per visual observation and good performance with progression in resisted activity within session. Remaining deficits still noted during session today are limited range of motion in abduction and endrange external/internal range of motion. Heladio Guadalupe will continue to benefit from skilled physical therapy services to address deficits and continue to work towards reaching functional goals. No changes to current PT POC, patient will continue AT.       Next Visit:  Continue as tolerated  Scapular control and progressed resisted  activity  Assess for response to resistance during today's session      Timed:         Manual Therapy:    10     mins  10380;     Therapeutic Exercise: 15    mins  98172;     Neuromuscular Len:    15    mins  44618;    Therapeutic Activity:     15     mins  47002;     Gait Training:          mins  79973;     Ultrasound:          mins  26298;    Ionto                                   mins   44569  Self Care                            mins   92302  Canalith Repos         mins 39688  Electrical Stimulation:         mins  25094    Un-Timed:  Electrical Stimulation:         mins  19434 ( );  Dry Needling          mins self-pay  Traction          mins 44940      Timed Treatment:   55   mins   Total Treatment:     55   mins    Visit and Documentation completed by Stephan Carlin PT,  YELENA   [place] License Number: [id number]    Octavio Dyer PT, DPT, OCS, Cert. DN  [place] License: [id number]

## 2024-09-10 ENCOUNTER — TREATMENT (OUTPATIENT)
Dept: PHYSICAL THERAPY | Facility: CLINIC | Age: 44
End: 2024-09-10
Payer: OTHER MISCELLANEOUS

## 2024-09-10 DIAGNOSIS — Z98.890 S/P RIGHT ROTATOR CUFF REPAIR: ICD-10-CM

## 2024-09-10 DIAGNOSIS — R29.898 WEAKNESS OF RIGHT SHOULDER: ICD-10-CM

## 2024-09-10 DIAGNOSIS — M25.611 DECREASED RIGHT SHOULDER RANGE OF MOTION: Primary | ICD-10-CM

## 2024-09-10 PROCEDURE — 97112 NEUROMUSCULAR REEDUCATION: CPT | Performed by: PHYSICAL THERAPIST

## 2024-09-10 PROCEDURE — 97110 THERAPEUTIC EXERCISES: CPT | Performed by: PHYSICAL THERAPIST

## 2024-09-10 PROCEDURE — 97140 MANUAL THERAPY 1/> REGIONS: CPT | Performed by: PHYSICAL THERAPIST

## 2024-09-12 ENCOUNTER — TREATMENT (OUTPATIENT)
Dept: PHYSICAL THERAPY | Facility: CLINIC | Age: 44
End: 2024-09-12
Payer: OTHER MISCELLANEOUS

## 2024-09-12 DIAGNOSIS — Z98.890 S/P RIGHT ROTATOR CUFF REPAIR: ICD-10-CM

## 2024-09-12 DIAGNOSIS — M25.611 DECREASED RIGHT SHOULDER RANGE OF MOTION: Primary | ICD-10-CM

## 2024-09-12 DIAGNOSIS — R29.898 WEAKNESS OF RIGHT SHOULDER: ICD-10-CM

## 2024-09-17 ENCOUNTER — TREATMENT (OUTPATIENT)
Dept: PHYSICAL THERAPY | Facility: CLINIC | Age: 44
End: 2024-09-17
Payer: OTHER MISCELLANEOUS

## 2024-09-17 DIAGNOSIS — R29.898 WEAKNESS OF RIGHT SHOULDER: ICD-10-CM

## 2024-09-17 DIAGNOSIS — Z98.890 S/P RIGHT ROTATOR CUFF REPAIR: ICD-10-CM

## 2024-09-17 DIAGNOSIS — M25.611 DECREASED RIGHT SHOULDER RANGE OF MOTION: Primary | ICD-10-CM

## 2024-09-17 PROCEDURE — 97140 MANUAL THERAPY 1/> REGIONS: CPT | Performed by: PHYSICAL THERAPIST

## 2024-09-17 PROCEDURE — 97110 THERAPEUTIC EXERCISES: CPT | Performed by: PHYSICAL THERAPIST

## 2024-09-17 PROCEDURE — 97112 NEUROMUSCULAR REEDUCATION: CPT | Performed by: PHYSICAL THERAPIST

## 2024-09-19 ENCOUNTER — TREATMENT (OUTPATIENT)
Dept: PHYSICAL THERAPY | Facility: CLINIC | Age: 44
End: 2024-09-19
Payer: OTHER MISCELLANEOUS

## 2024-09-19 DIAGNOSIS — Z98.890 S/P RIGHT ROTATOR CUFF REPAIR: ICD-10-CM

## 2024-09-19 DIAGNOSIS — R29.898 WEAKNESS OF RIGHT SHOULDER: ICD-10-CM

## 2024-09-19 DIAGNOSIS — M25.611 DECREASED RIGHT SHOULDER RANGE OF MOTION: Primary | ICD-10-CM

## 2024-09-19 PROCEDURE — 97110 THERAPEUTIC EXERCISES: CPT | Performed by: PHYSICAL THERAPIST

## 2024-09-19 PROCEDURE — 97112 NEUROMUSCULAR REEDUCATION: CPT | Performed by: PHYSICAL THERAPIST

## 2024-09-19 PROCEDURE — 97140 MANUAL THERAPY 1/> REGIONS: CPT | Performed by: PHYSICAL THERAPIST

## 2024-09-19 PROCEDURE — 97530 THERAPEUTIC ACTIVITIES: CPT | Performed by: PHYSICAL THERAPIST

## 2024-09-24 ENCOUNTER — TREATMENT (OUTPATIENT)
Dept: PHYSICAL THERAPY | Facility: CLINIC | Age: 44
End: 2024-09-24
Payer: OTHER MISCELLANEOUS

## 2024-09-24 DIAGNOSIS — Z98.890 S/P RIGHT ROTATOR CUFF REPAIR: ICD-10-CM

## 2024-09-24 DIAGNOSIS — M25.611 DECREASED RIGHT SHOULDER RANGE OF MOTION: Primary | ICD-10-CM

## 2024-09-24 DIAGNOSIS — R29.898 WEAKNESS OF RIGHT SHOULDER: ICD-10-CM

## 2024-09-26 ENCOUNTER — TREATMENT (OUTPATIENT)
Dept: PHYSICAL THERAPY | Facility: CLINIC | Age: 44
End: 2024-09-26
Payer: OTHER MISCELLANEOUS

## 2024-09-26 DIAGNOSIS — R29.898 WEAKNESS OF RIGHT SHOULDER: ICD-10-CM

## 2024-09-26 DIAGNOSIS — Z98.890 S/P RIGHT ROTATOR CUFF REPAIR: ICD-10-CM

## 2024-09-26 DIAGNOSIS — M25.611 DECREASED RIGHT SHOULDER RANGE OF MOTION: Primary | ICD-10-CM

## 2024-10-08 ENCOUNTER — TREATMENT (OUTPATIENT)
Dept: PHYSICAL THERAPY | Facility: CLINIC | Age: 44
End: 2024-10-08
Payer: OTHER MISCELLANEOUS

## 2024-10-08 ENCOUNTER — OFFICE VISIT (OUTPATIENT)
Age: 44
End: 2024-10-08
Payer: OTHER MISCELLANEOUS

## 2024-10-08 VITALS
DIASTOLIC BLOOD PRESSURE: 78 MMHG | SYSTOLIC BLOOD PRESSURE: 118 MMHG | HEIGHT: 71 IN | BODY MASS INDEX: 29.34 KG/M2 | WEIGHT: 209.6 LBS

## 2024-10-08 DIAGNOSIS — Z98.890 S/P RIGHT ROTATOR CUFF REPAIR: ICD-10-CM

## 2024-10-08 DIAGNOSIS — R29.898 WEAKNESS OF RIGHT SHOULDER: ICD-10-CM

## 2024-10-08 DIAGNOSIS — M25.611 DECREASED RIGHT SHOULDER RANGE OF MOTION: Primary | ICD-10-CM
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
## 2024-10-08 PROCEDURE — 97112 NEUROMUSCULAR REEDUCATION: CPT | Performed by: PHYSICAL THERAPIST

## 2024-10-08 PROCEDURE — 97110 THERAPEUTIC EXERCISES: CPT | Performed by: PHYSICAL THERAPIST

## 2024-10-08 PROCEDURE — 97140 MANUAL THERAPY 1/> REGIONS: CPT | Performed by: PHYSICAL THERAPIST

## 2024-10-08 PROCEDURE — 97530 THERAPEUTIC ACTIVITIES: CPT | Performed by: PHYSICAL THERAPIST

## 2024-10-08 NOTE — PROGRESS NOTES
[facility] [place] [facility] Office Follow Up       Office Follow Up Visit       Patient Name: Heladio Guadalupe    Chief Complaint:   Chief Complaint   Patient presents with    Follow-up     6 week f/u--  4.5 months s/p Arthroscopic right rotator cuff repair;Arthroscopic subacromial decompression with acromioplasty 5/17/24       Referring Physician: Provider, No Known    History of Present Illness:   Heladio Guadalupe returns to clinic today for follow-up visit 4.5 months s/p right rotator cuff repair and subacromial decompression with Dr. Arrieta.  Last visit on 8/27/2024.  Recommended continued physical therapy at that time.  He has continued working with Octavio at [place].  Seeing great progress with range of motion.  Occasional popping with motion. Not painful when this happens. Slowly strengthening.    He has returned to work with restrictions.  No lifting more than 5 pounds with the right shoulder.    RIGHT SHOULDER  WORK INJURY  Rotator cuff tear  Exhausted nonoperative treatment with PT     Employer: [employer]  Job at work: manual labor, heavy duty  Date of Injury at Work: 9/22/2023     Mechanism of Injury at Work: He notes he was working with a grout pump hose that was clogged and 3 different times it jerked and finally his right arm gave out with acute pain to the shoulder     Procedure:  1.     Arthroscopic rotator cuff repair (1x1) - CPT 76260  2.     Arthroscopic subacromial decompression with acromioplasty - CPT 41519    Subjective     Review of Systems   Constitutional: Negative.  Negative for chills, fatigue and fever.   HENT: Negative.  Negative for congestion and dental problem.    Eyes: Negative.  Negative for blurred vision.   Respiratory: Negative.  Negative for shortness of breath.    Cardiovascular: Negative.  Negative for leg swelling.   Gastrointestinal: Negative.  Negative for abdominal pain.   Endocrine: Negative.  Negative  "for polyuria.   Genitourinary: Negative.  Negative for difficulty urinating.   Musculoskeletal:  Positive for arthralgias.   Skin: Negative.    Allergic/Immunologic: Negative.    Neurological: Negative.    Hematological: Negative.  Negative for adenopathy.   Psychiatric/Behavioral: Negative.  Negative for behavioral problems.         I have reviewed and updated the following portions of the patient's history and review of systems: allergies, current medications, past family history, past medical history, past social history, past surgical history and problem list.    Medications:   Current Outpatient Medications:     ibuprofen (ADVIL,MOTRIN) 800 MG tablet, Take 1 tablet by mouth Every 8 (Eight) Hours As Needed for Mild Pain., Disp: 90 tablet, Rfl: 1    Allergies: No Known Allergies      Objective      Vital Signs:   Vitals:    10/08/24 0755   BP: 118/78   Weight: 95.1 kg (209 lb 9.6 oz)   Height: 180 cm (70.87\")       Ortho Exam:  General: no acute distress, comfortable  Vitals reviewed in chart    Musculoskeletal Exam:    SIDE: Right shoulder  Incisions well healed    Tenderness: No focal tenderness    Range of motion measurements (degrees): 140/140/60/60  Negative lag sign  No evidence of septic joint      Results Review:  XR Shoulder 1 View Right  Imaging: shoulder x-rays 1 view - AP x-ray views    Side: RIGHT SHOULDER    Indication for shoulder x-ray 1 view: shoulder pain, postop evaluation   following surgery    Comparison: none    Findings: No acute bony pathology. Well appearing and well positioned   compared to preoperative imaging.  Located and no fracture noted.    I personally reviewed the above x-rays.       I have noted results reviewed from previous encounter.        Assessment / Plan      Assessment:   Diagnoses and all orders for this visit:    1. S/P right rotator cuff repair  -     Ambulatory Referral to Physical Therapy for Evaluation & Treatment    2. Work related injury  -     Ambulatory " Referral to Physical Therapy for Evaluation & Treatment        Quality Metrics:   BMI:   BMI is >= 25 and <30. (Overweight) The following options were offered after discussion;: weight loss educational material (shared in after visit summary)       Tobacco:   Heladio Guadalupe  reports that he has been smoking cigarettes. He started smoking about 29 years ago. He has a 59.5 pack-year smoking history. He has been exposed to tobacco smoke. He has never used smokeless tobacco.       Plan:  4.5 months s/p right rotator cuff repair and SAD. Great improvements noted in regards to right shoulder active range of motion and strength since last visit. Some impingement symptoms he is working through. He is slowly regaining his strength overhead. Recommend additional physical therapy for strengthening as tolerated.  Recommend return to work with light duty restrictions.  No lifting more than 10 pounds with the right arm.      Follow Up:   6 weeks     Florinda Owen PA-C  [facility]    Dictated using Dragon Speech Recognition.

## 2024-10-08 NOTE — PROGRESS NOTES
Physical Therapy Daily Treatment Note    [facility]   [address], [address] [place] [address]      Patient: Heladio Guadalupe   : 1980  Referring practitioner: Isac Arrieta MD  Date of Initial Visit: Type: THERAPY  Noted: 2024  Today's Date: 10/8/2024  Patient seen for 26 sessions    Certification Period 10/8/2024 thru 2025       Visit Diagnoses:    ICD-10-CM ICD-9-CM   1. Decreased right shoulder range of motion  M25.611 719.51   2. S/P right rotator cuff repair  Z98.890 V45.89   3. Weakness of right shoulder  R29.898 781.99       Subjective     Pt states that he feels like he is doing better and he has minimal pain or limitation in the right shoulder. He continues to have some popping in the shoulder with certain movement and feels some catching in the morning, but this is not limiting with his usual daily activities.     Objective   See Exercise, Manual, and Modality Logs for complete treatment.       Assessment/Plan     Pt is progressing well with therapy and he demonstrates an improvement in his right shoulder strength and tolerance to activity in the clinic. Will cont to progress as indicated.      Heladio Guadalupe will continue to benefit from skilled physical therapy services to address deficits and continue to work towards reaching functional goals.           Timed:         Manual Therapy:    16     mins  90639;     Therapeutic Exercise:    14     mins  77418;     Neuromuscular Len:    15    mins  88045;    Therapeutic Activity:     15     mins  67015;     Gait Training:           mins  18469;     Ultrasound:          mins  48698;    Ionto                                   mins   53706  Self Care                            mins   51599  Canalith Repos         mins 95365  Electrical Stimulation:         mins  08319    Un-Timed:  Electrical Stimulation:         mins  75591 ( );  Dry Needling          mins self-pay  Traction          mins 93461      Timed Treatment:    60   mins   Total Treatment:     60   mins    Octavio Dyer PT, GOLDENT, Cert. DN  [place] License: [id number]

## 2024-10-10 ENCOUNTER — TREATMENT (OUTPATIENT)
Dept: PHYSICAL THERAPY | Facility: CLINIC | Age: 44
End: 2024-10-10
Payer: OTHER MISCELLANEOUS

## 2024-10-10 DIAGNOSIS — R29.898 WEAKNESS OF RIGHT SHOULDER: ICD-10-CM

## 2024-10-10 DIAGNOSIS — Z98.890 S/P RIGHT ROTATOR CUFF REPAIR: ICD-10-CM

## 2024-10-10 DIAGNOSIS — M25.611 DECREASED RIGHT SHOULDER RANGE OF MOTION: Primary | ICD-10-CM

## 2024-10-15 ENCOUNTER — TREATMENT (OUTPATIENT)
Dept: PHYSICAL THERAPY | Facility: CLINIC | Age: 44
End: 2024-10-15
Payer: OTHER MISCELLANEOUS

## 2024-10-15 DIAGNOSIS — Z98.890 S/P RIGHT ROTATOR CUFF REPAIR: ICD-10-CM

## 2024-10-15 DIAGNOSIS — R29.898 WEAKNESS OF RIGHT SHOULDER: ICD-10-CM

## 2024-10-15 DIAGNOSIS — M25.611 DECREASED RIGHT SHOULDER RANGE OF MOTION: Primary | ICD-10-CM

## 2024-10-15 NOTE — PROGRESS NOTES
Physical Therapy Daily Treatment Note    [facility]   [address], [address] [place] [address]      Patient: Heladio Guadalupe   : 1980  Referring practitioner: Isac Arrieta MD  Date of Initial Visit: Type: THERAPY  Noted: 2024  Today's Date: 10/15/2024  Patient seen for 27 sessions    Certification Period 10/15/2024 thru 2025       Visit Diagnoses:    ICD-10-CM ICD-9-CM   1. Decreased right shoulder range of motion  M25.611 719.51   2. S/P right rotator cuff repair  Z98.890 V45.89   3. Weakness of right shoulder  R29.898 781.99       Subjective     Pt states that he is having some increase in soreness around the shoulder with increasing intensity of exercise in the clinic, but he is still able to complete all of his daily tasks without having a significant limitation or increase in pain.     Objective   See Exercise, Manual, and Modality Logs for complete treatment.       Assessment/Plan     Pt is progressing well and he demonstrates an improvement in his tolerance to activity and overall strength/ROM. Will cont to progress activity as indicated.      Heladio Guadalupe will continue to benefit from skilled physical therapy services to address deficits and continue to work towards reaching functional goals.           Timed:         Manual Therapy:    13     mins  97997;     Therapeutic Exercise:    12     mins  72897;     Neuromuscular Len:    15    mins  56253;    Therapeutic Activity:     15     mins  11191;     Gait Training:           mins  44013;     Ultrasound:          mins  37980;    Ionto                                   mins   03483  Self Care                            mins   22937  Canalith Repos         mins 18537  Electrical Stimulation:         mins  44360    Un-Timed:  Electrical Stimulation:         mins  09967 ( );  Dry Needling          mins self-pay  Traction          mins 65623      Timed Treatment:   55   mins   Total Treatment:     55   mins    Octavio  Gomez PT, DPT, Cert. DN  [place] License: [id number]

## 2024-10-17 ENCOUNTER — TREATMENT (OUTPATIENT)
Dept: PHYSICAL THERAPY | Facility: CLINIC | Age: 44
End: 2024-10-17
Payer: OTHER MISCELLANEOUS

## 2024-10-17 DIAGNOSIS — M25.611 DECREASED RIGHT SHOULDER RANGE OF MOTION: Primary | ICD-10-CM

## 2024-10-17 DIAGNOSIS — Z98.890 S/P RIGHT ROTATOR CUFF REPAIR: ICD-10-CM

## 2024-10-17 DIAGNOSIS — R29.898 WEAKNESS OF RIGHT SHOULDER: ICD-10-CM

## 2024-10-20 NOTE — PROGRESS NOTES
Physical Therapy Daily Treatment Note    [facility]   [address], [address] [place] [address]      Patient: Heladio Guadalupe   : 1980  Referring practitioner: Isac Arrieta MD  Date of Initial Visit: Type: THERAPY  Noted: 2024  Today's Date: 10/20/2024  Patient seen for 28 sessions    Certification Period 10/20/2024 thru 2025       Visit Diagnoses:    ICD-10-CM ICD-9-CM   1. Decreased right shoulder range of motion  M25.611 719.51   2. S/P right rotator cuff repair  Z98.890 V45.89   3. Weakness of right shoulder  R29.898 781.99       Subjective     Patient states that he feels like his shoulder still doing well although he continues to have some soreness after some treatments when we are trying to increase strengthening and some soreness after heavier lifting activities at home.  Patient denies having any significant pain at the shoulder at rest or throughout the day consistently.    Objective   See Exercise, Manual, and Modality Logs for complete treatment.       Assessment/Plan     Patient is make steady progress with therapy and he is able to form resisted activity in the clinic without a significant exacerbation of symptoms.  He demonstrates an improvement in his overall strength as well as in his rotator cuff strength specifically.  Will continue to progress activity as indicated.    Heladio Guadalupe will continue to benefit from skilled physical therapy services to address deficits and continue to work towards reaching functional goals.           Timed:         Manual Therapy:    16     mins  85284;     Therapeutic Exercise:    10     mins  37838;     Neuromuscular Len:        mins  92881;    Therapeutic Activity:     30     mins  14140;     Gait Training:           mins  16817;     Ultrasound:          mins  16200;    Ionto                                   mins   05717  Self Care                            mins   78670  Canalith Repos         mins 02958  Electrical  Stimulation:         mins  02024    Un-Timed:  Electrical Stimulation:         mins  31323 ( );  Dry Needling          mins self-pay  Traction          mins 31923      Timed Treatment:   56   mins   Total Treatment:     56   mins    Octavio Dyer PT, DPT, Cert. DN  [place] License: [id number]                   [FreeTextEntry1] : Since her last visit, Ms. Morales was unable to fill her sumatriptan prescription and has been taking fioricet for breakthrough headaches which helps but does not completely alleviate headaches. Has some nausea associated with HA at times.  Tried amitriptyline 10 mg QHS x 2 weeks but complicated by "not feeling herself" and dry mouth and was switched back to topamax 25 mg QOD by her PMD.  Still continues to have breakthrough HA 2 - 3 x/week.  More recently also has noticed some worsening sensory symptoms in the L hand resulting in unstable  strength while lifting weights.  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  Denies any neck pain.  Denies any new focal deficits.  HA semiology remains unchanged.  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 No additional pathology.

## 2024-10-22 ENCOUNTER — TREATMENT (OUTPATIENT)
Dept: PHYSICAL THERAPY | Facility: CLINIC | Age: 44
End: 2024-10-22
Payer: OTHER MISCELLANEOUS

## 2024-10-22 DIAGNOSIS — M25.611 DECREASED RIGHT SHOULDER RANGE OF MOTION: Primary | ICD-10-CM

## 2024-10-22 DIAGNOSIS — Z98.890 S/P RIGHT ROTATOR CUFF REPAIR: ICD-10-CM

## 2024-10-22 DIAGNOSIS — R29.898 WEAKNESS OF RIGHT SHOULDER: ICD-10-CM

## 2024-10-22 PROCEDURE — 97112 NEUROMUSCULAR REEDUCATION: CPT | Performed by: PHYSICAL THERAPIST

## 2024-10-22 PROCEDURE — 97530 THERAPEUTIC ACTIVITIES: CPT | Performed by: PHYSICAL THERAPIST

## 2024-10-22 PROCEDURE — 97140 MANUAL THERAPY 1/> REGIONS: CPT | Performed by: PHYSICAL THERAPIST

## 2024-10-23 NOTE — PROGRESS NOTES
Physical Therapy Daily Treatment Note    [facility]   [address], [address] [place] [address]      Patient: Heladio Guadalupe   : 1980  Referring practitioner: Isac Arrieta MD  Date of Initial Visit: Type: THERAPY  Noted: 2024  Today's Date: 10/23/2024  Patient seen for 29 sessions    Certification Period 10/23/2024 thru 2025       Visit Diagnoses:    ICD-10-CM ICD-9-CM   1. Decreased right shoulder range of motion  M25.611 719.51   2. S/P right rotator cuff repair  Z98.890 V45.89   3. Weakness of right shoulder  R29.898 781.99       Subjective     Patient states that he feels like he is making progress in regards to his strength and he continues to be able to do more with the right arm and shoulder without having a significant exacerbation of symptoms.  He does still have complaints of intermittent popping in the shoulder and some discomfort when raising the arm up to the side.    Objective   See Exercise, Manual, and Modality Logs for complete treatment.       Assessment/Plan     Patient may steady progress with therapy and he demonstrates excellent range of motion in all planes.  Actively he has difficulty with right shoulder abduction but this seems to be improving as well as we have been able to progress rotator cuff strengthening.  Will continue to progress as indicated.    Heladio Guadalupe will continue to benefit from skilled physical therapy services to address deficits and continue to work towards reaching functional goals.           Timed:         Manual Therapy:    14     mins  40468;     Therapeutic Exercise:         mins  00843;     Neuromuscular Len:    15    mins  70799;    Therapeutic Activity:     30     mins  54662;     Gait Training:           mins  40094;     Ultrasound:          mins  09658;    Ionto                                   mins   38577  Self Care                            mins   32017  Canalith Repos         mins 81747  Electrical Stimulation:          mins  78823    Un-Timed:  Electrical Stimulation:         mins  79160 ( );  Dry Needling          mins self-pay  Traction          mins 08032      Timed Treatment:   59   mins   Total Treatment:     59   mins    Octavio Dyer PT, DPT, Cert. DN  [place] License: [id number]

## 2024-10-24 ENCOUNTER — TREATMENT (OUTPATIENT)
Dept: PHYSICAL THERAPY | Facility: CLINIC | Age: 44
End: 2024-10-24
Payer: OTHER MISCELLANEOUS

## 2024-10-24 DIAGNOSIS — M25.611 DECREASED RIGHT SHOULDER RANGE OF MOTION: Primary | ICD-10-CM

## 2024-10-24 DIAGNOSIS — Z98.890 S/P RIGHT ROTATOR CUFF REPAIR: ICD-10-CM

## 2024-10-24 DIAGNOSIS — R29.898 WEAKNESS OF RIGHT SHOULDER: ICD-10-CM

## 2024-10-29 ENCOUNTER — TREATMENT (OUTPATIENT)
Dept: PHYSICAL THERAPY | Facility: CLINIC | Age: 44
End: 2024-10-29
Payer: OTHER MISCELLANEOUS

## 2024-10-29 DIAGNOSIS — Z98.890 S/P RIGHT ROTATOR CUFF REPAIR: ICD-10-CM

## 2024-10-29 DIAGNOSIS — M25.611 DECREASED RIGHT SHOULDER RANGE OF MOTION: Primary | ICD-10-CM

## 2024-10-29 DIAGNOSIS — R29.898 WEAKNESS OF RIGHT SHOULDER: ICD-10-CM

## 2024-10-29 NOTE — PROGRESS NOTES
Physical Therapy Daily Treatment Note    [facility]   [address], [address] [place] [address]      Patient: Heladio Guadalupe   : 1980  Referring practitioner: Isac Arrieta MD  Date of Initial Visit: Type: THERAPY  Noted: 2023  Today's Date: 10/29/2024  Patient seen for 17 sessions    Certification Period 10/29/2024 thru 2025       Visit Diagnoses:    ICD-10-CM ICD-9-CM   1. Decreased right shoulder range of motion  M25.611 719.51   2. S/P right rotator cuff repair  Z98.890 V45.89   3. Weakness of right shoulder  R29.898 781.99       Subjective     Pt states that he is feeling good overall and he feels improement in strength and pain free motion of the right arm. He is able to complete most daily tasks without exacerbation of symptoms.     Objective   See Exercise, Manual, and Modality Logs for complete treatment.       Assessment/Plan     Pt is making steady progress and he demonstrates an improvement in his strength and tolerance to overhead activity in the clinic. Will cont to progress as indicated.      Heladio Guadalupe will continue to benefit from skilled physical therapy services to address deficits and continue to work towards reaching functional goals.           Timed:         Manual Therapy:    14     mins  02628;     Therapeutic Exercise:         mins  12550;     Neuromuscular Len:    14    mins  66860;    Therapeutic Activity:     28     mins  98643;     Gait Training:           mins  88414;     Ultrasound:          mins  01447;    Ionto                                   mins   33086  Self Care                            mins   60544  Canalith Repos         mins 11437  Electrical Stimulation:         mins  68670    Un-Timed:  Electrical Stimulation:         mins  79647 ( );  Dry Needling          mins self-pay  Traction          mins 95103      Timed Treatment:   56   mins   Total Treatment:     56   mins    Octavio Dyer, PT, DPT, Cert. DN  [place] License:  [id number]

## 2024-10-31 ENCOUNTER — TREATMENT (OUTPATIENT)
Dept: PHYSICAL THERAPY | Facility: CLINIC | Age: 44
End: 2024-10-31
Payer: OTHER MISCELLANEOUS

## 2024-10-31 DIAGNOSIS — Z98.890 S/P RIGHT ROTATOR CUFF REPAIR: ICD-10-CM

## 2024-10-31 DIAGNOSIS — R29.898 WEAKNESS OF RIGHT SHOULDER: ICD-10-CM

## 2024-10-31 DIAGNOSIS — M25.611 DECREASED RIGHT SHOULDER RANGE OF MOTION: Primary | ICD-10-CM

## 2024-10-31 NOTE — PROGRESS NOTES
Physical Therapy Daily Treatment Note    [facility]   [address], [address] [place] [address]      Patient: Heladio Guadalupe   : 1980  Referring practitioner: Isac Arrieta MD  Date of Initial Visit: Type: THERAPY  Noted: 2024  Today's Date: 10/31/2024  Patient seen for 31 sessions    Certification Period 10/31/2024 thru 2025       Visit Diagnoses:    ICD-10-CM ICD-9-CM   1. Decreased right shoulder range of motion  M25.611 719.51   2. S/P right rotator cuff repair  Z98.890 V45.89   3. Weakness of right shoulder  R29.898 781.99       Subjective     Patient states that because like he is doing very well and he has been able to perform more activity outside of the clinic and slightly heavy lifting at work without having exacerbation of symptoms.  He continues to have some rotation with lifting the arm to the side but he feels that this is showing some signs of progress recently as well.    Objective   See Exercise, Manual, and Modality Logs for complete treatment.       Assessment/Plan     Patient continues to demonstrate excellent passive range of motion of the right shoulder in all planes and his active range of motion is improving as well.  Patient is able to form higher resistance activities in the clinic without excessive fatigue or pain in the shoulder.  Will continue to progress activity as indicated.    Heladio Guadalupe will continue to benefit from skilled physical therapy services to address deficits and continue to work towards reaching functional goals.           Timed:         Manual Therapy:    12     mins  08153;     Therapeutic Exercise:         mins  66235;     Neuromuscular Len:    15    mins  47184;    Therapeutic Activity:     30     mins  77188;     Gait Training:           mins  33771;     Ultrasound:          mins  45429;    Ionto                                   mins   25923  Self Care                            mins   25404  [facility]         mins  50927  Electrical Stimulation:         mins  64815    Un-Timed:  Electrical Stimulation:         mins  16110 ( );  Dry Needling          mins self-pay  Traction          mins 42947      Timed Treatment:   57   mins   Total Treatment:     57   mins    Octavio Dyer PT, DPT, Cert. DN  [place] License: [id number]

## 2024-10-31 NOTE — PROGRESS NOTES
Physical Therapy Daily Treatment Note    [facility]   [address], [address] [place] [address]      Patient: Heladio Guadalupe   : 1980  Referring practitioner: Isac Arrieta MD  Date of Initial Visit: Type: THERAPY  Noted: 2024  Today's Date: 10/31/2024  Patient seen for 30 sessions    Certification Period 10/31/2024 thru 2025       Visit Diagnoses:    ICD-10-CM ICD-9-CM   1. Decreased right shoulder range of motion  M25.611 719.51   2. S/P right rotator cuff repair  Z98.890 V45.89   3. Weakness of right shoulder  R29.898 781.99       Subjective     Patient states that he feels like he is slowing steady progress and he continues to be able to do more activity with the shoulder both at work and at home without having any significant exacerbation of symptoms.  He continues to have some difficulty with raising the arm to the side but he feels that this is gradually improving as well.    Objective   See Exercise, Manual, and Modality Logs for complete treatment.       Assessment/Plan     Patient make steady progress with therapy and he demonstrates an improvement in his overall strength and ability perform resisted activity in the clinic.  Patient is still limited with his ability to abduct the right shoulder and has limitation in his active range of motion although his quality of motion within his available range has improved over the past couple of weeks.  Will continue to progress activity as indicated.    Heladio Guadalupe will continue to benefit from skilled physical therapy services to address deficits and continue to work towards reaching functional goals.           Timed:         Manual Therapy:    12     mins  88610;     Therapeutic Exercise:    10     mins  52961;     Neuromuscular Len:    15    mins  52558;    Therapeutic Activity:     18     mins  16239;     Gait Training:           mins  40153;     Ultrasound:          mins  53950;    Ionto                                    mins   54309  Self Care                            mins   55976  Canalith Repos         mins 43365  Electrical Stimulation:         mins  74117    Un-Timed:  Electrical Stimulation:         mins  85223 ( );  Dry Needling          mins self-pay  Traction          mins 26657      Timed Treatment:   55   mins   Total Treatment:     55   mins    Octavio Dyer PT, DPT, Cert. DN  [place] License: [id number]

## 2024-11-05 ENCOUNTER — TREATMENT (OUTPATIENT)
Dept: PHYSICAL THERAPY | Facility: CLINIC | Age: 44
End: 2024-11-05
Payer: OTHER MISCELLANEOUS

## 2024-11-05 DIAGNOSIS — Z98.890 S/P RIGHT ROTATOR CUFF REPAIR: ICD-10-CM

## 2024-11-05 DIAGNOSIS — R29.898 WEAKNESS OF RIGHT SHOULDER: ICD-10-CM

## 2024-11-05 DIAGNOSIS — M25.611 DECREASED RIGHT SHOULDER RANGE OF MOTION: Primary | ICD-10-CM

## 2024-11-05 PROCEDURE — 97110 THERAPEUTIC EXERCISES: CPT | Performed by: PHYSICAL THERAPIST

## 2024-11-05 PROCEDURE — 97530 THERAPEUTIC ACTIVITIES: CPT | Performed by: PHYSICAL THERAPIST

## 2024-11-05 PROCEDURE — 97140 MANUAL THERAPY 1/> REGIONS: CPT | Performed by: PHYSICAL THERAPIST

## 2024-11-06 NOTE — PROGRESS NOTES
Physical Therapy Daily Treatment Note    [facility]   [address] [place] [address]      Patient: Heladio Guadalupe   : 1980  Referring practitioner: Isac Arrieta MD  Date of Initial Visit: Type: THERAPY  Noted: 2024  Today's Date: 2024  Patient seen for 32 sessions    Certification Period 2024 thru 2025       Visit Diagnoses:    ICD-10-CM ICD-9-CM   1. Decreased right shoulder range of motion  M25.611 719.51   2. S/P right rotator cuff repair  Z98.890 V45.89   3. Weakness of right shoulder  R29.898 781.99       Subjective     Pt states that he feels like he has continued to make progress and he has noticed less popping in the shoulder and less discomfort when lifting his arm up to the side.     Objective   See Exercise, Manual, and Modality Logs for complete treatment.       Assessment/Plan     Pt has progressed well with therapy and he demonstrates improved ROM of the right shoulder in all planes and an improved ability to perform strengthening of the RTC in the clinic without increasing pain. Will cont to progress as indicated      Heladio Guadalupe will continue to benefit from skilled physical therapy services to address deficits and continue to work towards reaching functional goals.           Timed:         Manual Therapy:    12     mins  47668;     Therapeutic Exercise:    14     mins  21989;     Neuromuscular Len:        mins  97472;    Therapeutic Activity:     30     mins  35500;     Gait Training:           mins  23376;     Ultrasound:          mins  92820;    Ionto                                   mins   48479  Self Care                            mins   39130  Canalith Repos         mins 83817  Electrical Stimulation:         mins  85432    Un-Timed:  Electrical Stimulation:         mins  95190 ( );  Dry Needling          mins self-pay  Traction          mins 54464      Timed Treatment:   56   mins   Total Treatment:     56   mins    Octavio  Gomez PT, DPT, Cert. DN  [place] License: [id number]

## 2024-11-12 ENCOUNTER — TREATMENT (OUTPATIENT)
Dept: PHYSICAL THERAPY | Facility: CLINIC | Age: 44
End: 2024-11-12
Payer: OTHER MISCELLANEOUS

## 2024-11-12 DIAGNOSIS — Z98.890 S/P RIGHT ROTATOR CUFF REPAIR: ICD-10-CM

## 2024-11-12 DIAGNOSIS — R29.898 WEAKNESS OF RIGHT SHOULDER: ICD-10-CM

## 2024-11-12 DIAGNOSIS — M25.611 DECREASED RIGHT SHOULDER RANGE OF MOTION: Primary | ICD-10-CM

## 2024-11-12 NOTE — PROGRESS NOTES
Physical Therapy Daily Treatment Note    [facility]   [address], [address] [place] [address]      Patient: Heladio Guadalupe   : 1980  Referring practitioner: Isac Arrieta MD  Date of Initial Visit: Type: THERAPY  Noted: 2024  Today's Date: 2024  Patient seen for 33 sessions    Certification Period 2024 thru 2025       Visit Diagnoses:    ICD-10-CM ICD-9-CM   1. Decreased right shoulder range of motion  M25.611 719.51   2. S/P right rotator cuff repair  Z98.890 V45.89   3. Weakness of right shoulder  R29.898 781.99       Subjective     Pt states that he feels like he has been doing well and he continues to be able to do more with the shoulder without having an exacerbation of symptoms in the shoulder.     Objective   See Exercise, Manual, and Modality Logs for complete treatment.       Assessment/Plan     Pt has progressed well and he has been able to maintain good ROM of the right shoulder and shows an improvement in his strength as well. Will cont to progress as indicated.      Heladio Guadalupe will continue to benefit from skilled physical therapy services to address deficits and continue to work towards reaching functional goals.           Timed:         Manual Therapy:    12     mins  56529;     Therapeutic Exercise:    10     mins  24854;     Neuromuscular Len:        mins  48658;    Therapeutic Activity:     34     mins  98524;     Gait Training:           mins  83890;     Ultrasound:          mins  48004;    Ionto                                   mins   52626  Self Care                            mins   29958  Canalith Repos         mins 19625  Electrical Stimulation:         mins  84632    Un-Timed:  Electrical Stimulation:         mins  38930 ( );  Dry Needling          mins self-pay  Traction          mins 19976      Timed Treatment:   56   mins   Total Treatment:     56   mins    Octavio Dyer, PT, DPT, Cert. DN  [place] License: [id number]                  
no

## 2024-11-13 DIAGNOSIS — Z98.890 S/P RIGHT ROTATOR CUFF REPAIR: Primary | ICD-10-CM
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
## 2024-11-14 ENCOUNTER — TREATMENT (OUTPATIENT)
Dept: PHYSICAL THERAPY | Facility: CLINIC | Age: 44
End: 2024-11-14
Payer: OTHER MISCELLANEOUS

## 2024-11-14 DIAGNOSIS — Z98.890 S/P RIGHT ROTATOR CUFF REPAIR: ICD-10-CM

## 2024-11-14 DIAGNOSIS — M25.611 DECREASED RIGHT SHOULDER RANGE OF MOTION: Primary | ICD-10-CM

## 2024-11-14 DIAGNOSIS — R29.898 WEAKNESS OF RIGHT SHOULDER: ICD-10-CM

## 2024-11-14 NOTE — PROGRESS NOTES
Physical Therapy Daily Treatment Note    [facility]   [address], [address] [place] [address]      Patient: Heladio Guadalupe   : 1980  Referring practitioner: Isac Arrieta MD  Date of Initial Visit: Type: THERAPY  Noted: 2024  Today's Date: 2024  Patient seen for 34 sessions    Certification Period 2024 thru 2/10/2025       Visit Diagnoses:    ICD-10-CM ICD-9-CM   1. Decreased right shoulder range of motion  M25.611 719.51   2. S/P right rotator cuff repair  Z98.890 V45.89   3. Weakness of right shoulder  R29.898 781.99       Subjective     Patient states that he feels like he is made good progress with therapy and he continues to be able to do more activity at home and at work without having an increase in pain in the shoulder.    Objective   See Exercise, Manual, and Modality Logs for complete treatment.       Assessment/Plan     Patient is progressed well with therapy and he demonstrates an improvement in his ability to perform resisted and functional tasks.  Decreased hypertonicity noted in the right deltoid and upper trap muscles.  Will continue to progress functional activities indicated.    Heladio Guadalupe will continue to benefit from skilled physical therapy services to address deficits and continue to work towards reaching functional goals.           Timed:         Manual Therapy:    14     mins  86601;     Therapeutic Exercise:    15     mins  26373;     Neuromuscular Len:        mins  74982;    Therapeutic Activity:     30     mins  21641;     Gait Training:           mins  58309;     Ultrasound:          mins  61392;    Ionto                                   mins   16027  Self Care                            mins   79759  Canalith Repos         mins 79997  Electrical Stimulation:         mins  45937    Un-Timed:  Electrical Stimulation:         mins  92676 ( );  Dry Needling          mins self-pay  Traction          mins 45819      Timed Treatment:    59   mins   Total Treatment:     59   mins    Octavio Dyer, PT, DPT, Cert. DN  [place] License: [id number]

## 2024-11-15 NOTE — PROGRESS NOTES
Physical Therapy Daily Treatment Note    [facility]   [address], [address] [place] [address]      Patient: Heladio Guadalupe   : 1980  Referring practitioner: Isac Arrieta MD  Date of Initial Visit: Type: THERAPY  Noted: 2024  Today's Date: 2024  Patient seen for 35 sessions    Certification Period 2024 thru 2025       Visit Diagnoses:    ICD-10-CM ICD-9-CM   1. Decreased right shoulder range of motion  M25.611 719.51   2. S/P right rotator cuff repair  Z98.890 V45.89   3. Weakness of right shoulder  R29.898 781.99       Subjective     States that he is doing well and he denies having any he significant pain in the shoulder today.  He has not had any popping in the shoulder but he feels some aching at times as he increases his activity    Objective   See Exercise, Manual, and Modality Logs for complete treatment.       Assessment/Plan     Patient is make steady progress with therapy and demonstrates improvement in his ability to perform all resisted activities and functional tasks in the clinic.  Will continue to focus on improving functional strength and will progress as indicated.    Heladio Guadalupe will continue to benefit from skilled physical therapy services to address deficits and continue to work towards reaching functional goals.           Timed:         Manual Therapy:    12     mins  24842;     Therapeutic Exercise:         mins  28483;     Neuromuscular Len:    15    mins  91965;    Therapeutic Activity:     30     mins  63447;     Gait Training:           mins  12971;     Ultrasound:          mins  10162;    Ionto                                   mins   73554  Self Care                            mins   04708  Canalith Repos         mins 13255  Electrical Stimulation:         mins  17499    Un-Timed:  Electrical Stimulation:         mins  28452 ( );  Dry Needling          mins self-pay  Traction          mins 46258      Timed Treatment:   57    mins   Total Treatment:     57   mins    Octavio Dyer, PT, DPT, Cert. DN  [place] License: [id number]

## 2024-11-19 ENCOUNTER — TREATMENT (OUTPATIENT)
Dept: PHYSICAL THERAPY | Facility: CLINIC | Age: 44
End: 2024-11-19
Payer: OTHER MISCELLANEOUS

## 2024-11-19 ENCOUNTER — OFFICE VISIT (OUTPATIENT)
Age: 44
End: 2024-11-19
Payer: OTHER MISCELLANEOUS

## 2024-11-19 VITALS
WEIGHT: 210 LBS | SYSTOLIC BLOOD PRESSURE: 118 MMHG | BODY MASS INDEX: 29.4 KG/M2 | DIASTOLIC BLOOD PRESSURE: 72 MMHG | HEIGHT: 71 IN

## 2024-11-19 DIAGNOSIS — M25.611 DECREASED RIGHT SHOULDER RANGE OF MOTION: Primary | ICD-10-CM

## 2024-11-19 DIAGNOSIS — Z98.890 S/P RIGHT ROTATOR CUFF REPAIR: ICD-10-CM

## 2024-11-19 DIAGNOSIS — R29.898 WEAKNESS OF RIGHT SHOULDER: ICD-10-CM
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
## 2024-11-19 DIAGNOSIS — Z98.890 S/P RIGHT ROTATOR CUFF REPAIR: Primary | ICD-10-CM

## 2024-11-19 PROCEDURE — 99213 OFFICE O/P EST LOW 20 MIN: CPT

## 2024-11-19 NOTE — PROGRESS NOTES
[facility] Office Follow Up       Office Follow Up Visit       Patient Name: Heladio Guadalupe    Chief Complaint:   Chief Complaint   Patient presents with    Follow-up     7 week recheck - s/p Arthroscopic right rotator cuff repair;Arthroscopic subacromial decompression with acromioplasty 5/17/24       Referring Physician: Provider, No Known    History of Present Illness:   Heladio Guadalupe returns to clinic today for follow-up visit 6 months s/p right arthroscopic rotator cuff repair and subacromial decompression with Dr. Arrieta.  Last visit on 10/8/2024.  He reports improvements with the right shoulder pain and range of motion since last visit.  Continues to work with Octavio at [employer] [facility] [employer] [facility] [employer].  Working on functional strength for return to work. Great progress to this point. No new concerns or injury.    Working light duty currently. No lifting more than 10 pounds.    RIGHT SHOULDER  WORK INJURY  Rotator cuff tear  Exhausted nonoperative treatment with PT     Employer: [employer]  Job at work: manual labor, heavy duty  Date of Injury at Work: 9/22/2023     Mechanism of Injury at Work: He notes he was working with a grout pump hose that was clogged and 3 different times it jerked and finally his right arm gave out with acute pain to the shoulder     Procedure:  1.     Arthroscopic rotator cuff repair (1x1) - CPT 63531  2.     Arthroscopic subacromial decompression with acromioplasty - CPT 20654    Subjective     Review of Systems   Constitutional:  Negative for chills, fever, unexpected weight gain and unexpected weight loss.   HENT:  Negative for congestion, postnasal drip and rhinorrhea.    Eyes:  Negative for blurred vision.   Respiratory:  Negative for shortness of breath.    Cardiovascular:  Negative for leg swelling.   Gastrointestinal:  Negative for abdominal pain, nausea and vomiting.   Genitourinary:  Negative for  "difficulty urinating.   Musculoskeletal:  Positive for arthralgias. Negative for gait problem, joint swelling and myalgias.   Skin:  Negative for skin lesions and wound.   Neurological:  Negative for dizziness, weakness, light-headedness and numbness.   Hematological:  Does not bruise/bleed easily.   Psychiatric/Behavioral:  Negative for depressed mood.    All other systems reviewed and are negative.       I have reviewed and updated the following portions of the patient's history and review of systems: allergies, current medications, past family history, past medical history, past social history, past surgical history and problem list.    Medications:   Current Outpatient Medications:     ibuprofen (ADVIL,MOTRIN) 800 MG tablet, Take 1 tablet by mouth Every 8 (Eight) Hours As Needed for Mild Pain., Disp: 90 tablet, Rfl: 1    Allergies: No Known Allergies      Objective      Vital Signs:   Vitals:    11/19/24 0808   BP: 118/72   Weight: 95.3 kg (210 lb)   Height: 180 cm (70.87\")       Ortho Exam:  General: no acute distress, comfortable  Vitals reviewed in chart    Musculoskeletal Exam:    SIDE: Right shoulder  Incisions well healed    Tenderness: No focal tenderness  Smooth arc of motion  Range of motion measurements (degrees): 160/160/60/60  Painful arc of motion: No  Great rotator cuff strength compared to prior  No evidence of septic joint      Results Review:  XR Shoulder 1 View Right  Imaging: shoulder x-rays 1 view - AP x-ray views    Side: RIGHT SHOULDER    Indication for shoulder x-ray 1 view: shoulder pain, postop evaluation   following surgery    Comparison: none    Findings: No acute bony pathology. Well appearing and well positioned   compared to preoperative imaging.  Located and no fracture noted.    I personally reviewed the above x-rays.       No Images in the past 120 days found..      Assessment / Plan      Assessment:   Diagnoses and all orders for this visit:    1. S/P right rotator cuff repair " (Primary)    2. Work related injury        Quality Metrics:   BMI:   BMI is >= 25 and <30. (Overweight) The following options were offered after discussion;: weight loss educational material (shared in after visit summary)       Tobacco:   Heladio Guadalupe  reports that he has been smoking cigarettes. He started smoking about 29 years ago. He has a 59.7 pack-year smoking history. He has been exposed to tobacco smoke. He has never used smokeless tobacco.       Plan:  6 months s/p right rotator cuff repair with Dr. Arrieta.  Overall pain and range of motion have greatly improved.  Recommend additional physical therapy for right shoulder strengthening.  He will require additional function strength for full return to work.   Recommend light duty restrictions no lifting more than 15 pounds and no overhead lifting with the right arm until next visit. Targeting full duty return next visit.  May take over the counter anti-inflammatories as needed.      Follow Up:   6 weeks      Florinda Owen PA-C  [facility]    Dictated using Dragon Speech Recognition.

## 2024-11-21 ENCOUNTER — TREATMENT (OUTPATIENT)
Dept: PHYSICAL THERAPY | Facility: CLINIC | Age: 44
End: 2024-11-21
Payer: OTHER MISCELLANEOUS

## 2024-11-21 DIAGNOSIS — R29.898 WEAKNESS OF RIGHT SHOULDER: ICD-10-CM

## 2024-11-21 DIAGNOSIS — M25.611 DECREASED RIGHT SHOULDER RANGE OF MOTION: Primary | ICD-10-CM

## 2024-11-21 DIAGNOSIS — Z98.890 S/P RIGHT ROTATOR CUFF REPAIR: ICD-10-CM

## 2024-11-25 NOTE — PROGRESS NOTES
Physical Therapy Daily Treatment Note    [facility]   [address], [address] [place] [address]      Patient: Heladio Guadalupe   : 1980  Referring practitioner: Isac Arrieta MD  Date of Initial Visit: Type: THERAPY  Noted: 2024  Today's Date: 2024  Patient seen for 36 sessions    Certification Period 2024 thru 2025       Visit Diagnoses:    ICD-10-CM ICD-9-CM   1. Decreased right shoulder range of motion  M25.611 719.51   2. S/P right rotator cuff repair  Z98.890 V45.89   3. Weakness of right shoulder  R29.898 781.99       Subjective     Patient states that he feels like he is doing well and he is making steady progress with therapy.  He continues to be able to do more work with the arm without having excessive amount of fatigue or pain.      Objective   See Exercise, Manual, and Modality Logs for complete treatment.       Assessment/Plan     Patient is progressing well in the clinic and he is able to perform functional activity and resisted exercise without a significant exacerbation of symptoms.  Will continue to progress as indicated.    Heladio Guadalupe will continue to benefit from skilled physical therapy services to address deficits and continue to work towards reaching functional goals.           Timed:         Manual Therapy:    14     mins  16529;     Therapeutic Exercise:    15     mins  74114;     Neuromuscular Len:        mins  72223;    Therapeutic Activity:     30     mins  70595;     Gait Training:           mins  03620;     Ultrasound:          mins  00489;    Ionto                                   mins   69100  Self Care                            mins   75098  Canalith Repos         mins 76357  Electrical Stimulation:         mins  82430    Un-Timed:  Electrical Stimulation:         mins  07410 ( );  Dry Needling          mins self-pay  Traction          mins 21582      Timed Treatment:   59   mins   Total Treatment:     59   mins    Octavio  Gomez PT, DPT, Cert. DN  [place] License: [id number]

## 2024-11-27 NOTE — PROGRESS NOTES
Physical Therapy Daily Treatment Note    [facility]   [address], [address] [place] [address]      Patient: Heladio Guadalupe   : 1980  Referring practitioner: Isac Arrieta MD  Date of Initial Visit: Type: THERAPY  Noted: 2024  Today's Date: 2024  Patient seen for 37 sessions    Certification Period 2024 thru 2025       Visit Diagnoses:    ICD-10-CM ICD-9-CM   1. Decreased right shoulder range of motion  M25.611 719.51   2. S/P right rotator cuff repair  Z98.890 V45.89   3. Weakness of right shoulder  R29.898 781.99       Subjective     Pt states that he is feeling improvement each week and he feels that his strength and ability to perform work related tasks has been significantly improved.     Objective   See Exercise, Manual, and Modality Logs for complete treatment.       Assessment/Plan     Pt is progressing well and he demonstrates a significant improvement in his abiity to perform all functional tasks. He continues to have some limitation in his ability to move the arm through a full abduction ROM with resistance, but all other planes are nearly normal.      Heladio Guadalupe will continue to benefit from skilled physical therapy services to address deficits and continue to work towards reaching functional goals.           Timed:         Manual Therapy:    13     mins  44585;     Therapeutic Exercise:         mins  98907;     Neuromuscular Len:    10    mins  66376;    Therapeutic Activity:     32     mins  83346;     Gait Training:           mins  92655;     Ultrasound:          mins  75356;    Ionto                                   mins   20429  Self Care                            mins   99298  Canalith Repos         mins 81015  Electrical Stimulation:         mins  75068    Un-Timed:  Electrical Stimulation:         mins  44410 ( );  Dry Needling          mins self-pay  Traction          mins 54786      Timed Treatment:   55   mins   Total Treatment:      55   mins    Octavio Dyer, PT, DPT, Cert. DN  [place] License: [id number]

## 2024-12-31 ENCOUNTER — OFFICE VISIT (OUTPATIENT)
Age: 44
End: 2024-12-31
Payer: OTHER MISCELLANEOUS

## 2024-12-31 VITALS
WEIGHT: 207.1 LBS | BODY MASS INDEX: 28.99 KG/M2 | DIASTOLIC BLOOD PRESSURE: 94 MMHG | HEIGHT: 71 IN | SYSTOLIC BLOOD PRESSURE: 134 MMHG
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
## 2024-12-31 DIAGNOSIS — G56.22 CUBITAL TUNNEL SYNDROME ON LEFT: ICD-10-CM

## 2024-12-31 DIAGNOSIS — Z98.890 S/P RIGHT ROTATOR CUFF REPAIR: Primary | ICD-10-CM

## 2024-12-31 NOTE — PROGRESS NOTES
[facility] Office Follow Up       Office Follow Up Visit       Patient Name: Heladio Guadalupe    Chief Complaint:   Chief Complaint   Patient presents with    Follow-up     6 week follow up; 7 months s/p Arthroscopic right rotator cuff repair;Arthroscopic subacromial decompression with acromioplasty 5/17/24       Referring Physician: Provider, No Known    History of Present Illness:   Heladio Guadalupe returns to clinic today for follow-up visit 7 months s/p right arthroscopic rotator cuff repair and subacromial decompression with Dr. Arrieta.  Last visit on 11/19/2024.  Recommended additional physical therapy for right shoulder strengthening at that time.  Unfortunately his Worker's Compensation insurance has denied additional physical therapy.  He has been working right shoulder range of motion exercises at home.  He has noticed some new numbness and tingling in the fourth and fifth digits.  Associated with driving.  Continues with work restrictions including no lifting more than 15 pounds with the left arm.    RIGHT SHOULDER  WORK INJURY  Rotator cuff tear  Exhausted nonoperative treatment with PT     Employer: [employer]  Job at work: manual labor, heavy duty  Date of Injury at Work: 9/22/2023     Mechanism of Injury at Work: He notes he was working with a grout pump hose that was clogged and 3 different times it jerked and finally his right arm gave out with acute pain to the shoulder     Procedure:  1.     Arthroscopic rotator cuff repair (1x1) - CPT 24506  2.     Arthroscopic subacromial decompression with acromioplasty - CPT 18846    Subjective     Review of Systems   Constitutional: Negative.    HENT: Negative.     Eyes: Negative.    Respiratory: Negative.     Cardiovascular: Negative.    Gastrointestinal: Negative.    Endocrine: Negative.    Genitourinary: Negative.    Musculoskeletal:  Positive for arthralgias.   Skin:  "Negative.    Allergic/Immunologic: Negative.    Neurological: Negative.    Hematological: Negative.    Psychiatric/Behavioral: Negative.          I have reviewed and updated the following portions of the patient's history and review of systems: allergies, current medications, past family history, past medical history, past social history, past surgical history and problem list.    Medications:   Current Outpatient Medications:     ibuprofen (ADVIL,MOTRIN) 800 MG tablet, Take 1 tablet by mouth Every 8 (Eight) Hours As Needed for Mild Pain., Disp: 90 tablet, Rfl: 1    Allergies: No Known Allergies      Objective      Vital Signs:   Vitals:    12/31/24 0807   BP: 134/94   Weight: 93.9 kg (207 lb 1.6 oz)   Height: 180 cm (70.87\")       Ortho Exam:  General: no acute distress, comfortable  Vitals reviewed in chart    Musculoskeletal Exam:    SIDE: Right shoulder    Tenderness: No focal tenderness    Range of motion: Right shoulder forward flexion to approximately 165, abduction to 160 degrees with discomfort. External and internal rotation within normal limits. Negative lag sign. Improved rotator cuff strength with mild pain on testing.  Painful arc of motion: No  Tinel's positive at elbow  No evidence of septic joint      Results Review:  XR Shoulder 1 View Right  Imaging: shoulder x-rays 1 view - AP x-ray views    Side: RIGHT SHOULDER    Indication for shoulder x-ray 1 view: shoulder pain, postop evaluation   following surgery    Comparison: none    Findings: No acute bony pathology. Well appearing and well positioned   compared to preoperative imaging.  Located and no fracture noted.    I personally reviewed the above x-rays.     I have noted results reviewed from previous encounter.      Assessment / Plan      Assessment:   Diagnoses and all orders for this visit:    1. S/P right rotator cuff repair (Primary)  -     Ambulatory Referral to Physical Therapy for Evaluation & Treatment    2. Work related injury  -     " Ambulatory Referral to Physical Therapy for Evaluation & Treatment    3. Cubital tunnel syndrome on left        Quality Metrics:   BMI:   BMI is >= 25 and <30. (Overweight) The following options were offered after discussion;: weight loss educational material (shared in after visit summary)       Tobacco:   Heladio Guadalupe  reports that he has been smoking cigarettes. He started smoking about 29 years ago. He has a 59.7 pack-year smoking history. He has been exposed to tobacco smoke. He has never used smokeless tobacco.           Plan:  7 months s/p right rotator cuff repair with Dr. Arrieta.  He has not been in physical therapy since 11/21/2024 due to insurance denial.  He has been working physical therapy exercises at home. MMI not yet reached. Recommend work hardening program for strengthening to hopefully return back to work without restrictions next visit.  Referral placed today.  Unable to determine if there will be future impairments were long-term restrictions at this time.  Work restrictions included no lifting more than 15 pounds overhead and no repetitive push/pull.  May take over-the-counter anti-inflammatories as needed.        Follow Up:   6 weeks    Florinda Owen PA-C  [facility]    Dictated using Dragon Speech Recognition.

## 2025-01-27 ENCOUNTER — TREATMENT (OUTPATIENT)
Dept: PHYSICAL THERAPY | Facility: CLINIC | Age: 45
End: 2025-01-27
Payer: OTHER MISCELLANEOUS

## 2025-01-27 DIAGNOSIS — M25.611 STIFFNESS OF RIGHT SHOULDER JOINT: ICD-10-CM

## 2025-01-27 DIAGNOSIS — R29.898 WEAKNESS OF RIGHT SHOULDER: ICD-10-CM

## 2025-01-27 DIAGNOSIS — G89.29 CHRONIC RIGHT SHOULDER PAIN: Primary | ICD-10-CM

## 2025-01-27 DIAGNOSIS — M25.511 CHRONIC RIGHT SHOULDER PAIN: Primary | ICD-10-CM

## 2025-01-27 PROCEDURE — 97161 PT EVAL LOW COMPLEX 20 MIN: CPT | Performed by: PHYSICAL THERAPIST

## 2025-01-27 NOTE — PROGRESS NOTES
Physical Therapy Initial Evaluation and Plan of Care    [facility]    [address], [address] [place] [address]    Patient: Heladio Guadalupe   : 1980  Diagnosis/ICD-10 Code:  Chronic right shoulder pain [M25.511, G89.29]  Referring practitioner: Florinda Owen PA-C  Date of Initial Visit: 2025  Today's Date: 2025  Patient seen for 1 session         Visit Diagnoses:    ICD-10-CM ICD-9-CM   1. Chronic right shoulder pain  M25.511 719.41    G89.29 338.29   2. Weakness of right shoulder  R29.898 781.99   3. Stiffness of right shoulder joint  M25.611 719.51         Subjective Evaluation    History of Present Illness  Mechanism of injury: Pt underwent PT at this location up until . At that point he did not have approval for therapy after that and he felt that a couple of weeks after his last therapy session he started to have numbness in the right hand, specifically on the 4th and 5th fingers. He has the numbness 1-2x/day. The symptoms can last for several hours.     In the shoulder specifically he feels like he has throbbing and aching in the right shoulder. He feels the aching through the day and when he is driving home. When he gets home he is able to get the arm in a comfortable position, and the pain will go away. He also feels like he has a catch in the right shoulder when he lifts the arm to the side.     Pt states that he would have difficulty with lifting scaffolding because, lifting bags of mortar above shoulder height (80#), lifting heavy cinder block (30-50#) overhead.       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 Pain  Current pain rating: 3  At best pain ratin  At worst pain ratin (quick onset of pain with movements)  Location: right shoulder  Quality: dull ache, sharp and throbbing  Relieving factors: rest and change in position  Aggravating factors: overhead activity, movement and lifting    Hand dominance:  right    Treatments  Previous treatment: massage  Patient Goals  Patient goals for therapy: decreased pain, increased strength and return to work                       -   Patient Active Problem List    Diagnosis Date Noted    Status post right rotator cuff repair 05/17/2024    Biceps tendinitis of right upper extremity 04/16/2024    Superior glenoid labrum lesion of right shoulder 04/16/2024    Traumatic incomplete tear of right rotator cuff 02/13/2024    Rotator cuff injury, right, initial encounter 11/09/2023    Work related injury 11/09/2023    Bursitis of right shoulder 11/09/2023    Impingement syndrome of right shoulder 11/09/2023    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 Isac smith        Objective          Palpation     Additional Palpation Details  Mild hypertonicity and TTP noted at the right deltoid and upper trap    TTP noted at the anterior right GH joint     Active Range of Motion     Right Shoulder   Flexion: 165 degrees   Abduction: 171 (painful arc - 105-135) degrees   External rotation 0°: 60 degrees   Internal rotation BTB: T11     Strength/Myotome Testing     Right Shoulder     Planes of Motion   Flexion: 5   Abduction: 4+   External rotation at 0°: 5   Internal rotation at 0°: 5     Additional Strength Details  Right shoulder flex - 26# dynanometer  Left Shoulder flex - 30# dynanometer     Right shoulder abd at 0* - 40#  - at 80* - 5#  dynanometer   Left Shoulder abd at 0# - 30#  - at 80* - 15#  dynanometer     Right shoulder ER @ 0* - 25#  dynanometer   Left shoulder ER @ 0* - 25#  dynanometer     Able to lift 50# DB OH using both UE's x5                 Assessment & Plan       Assessment  Impairments: abnormal muscle tone,  abnormal or restricted ROM, activity intolerance, impaired physical strength, lacks appropriate home exercise program and pain with function   Functional limitations: carrying objects, lifting, pulling, pushing, uncomfortable because of pain, reaching behind back, reaching overhead and unable to perform repetitive tasks   Assessment details: Patient is a 44 year old male who comes to physical therapy with c/o pain and limitation with work activities after injury and surgical repair of the right RTC resulting in pain, decreased ROM, decreased strength, and inability to perform all essential functional activities. Pt will benefit from skilled PT services to address the above issues.     Prognosis details: SHORT TERM GOALS:     2 weeks  1. Pt I w/ HEP  2. Pt to demonstrate RROM with 5# of the right shoulder to WFL to improve ability to perform ADL's  3. Pt to demonstrate ability to perform 60 minutes continuous activity in the clinic without increase in pain     LONG TERM GOALS:   6 weeks  1. Pt to demonstrate ability to lift 30# OH with the right shoulder without increasing pain  2. Pt to demonstrate ability to lift 75# floor to shoulder with the bilateral arm without increase in pain  3. Pt to report being able to work full duty without increase in pain in the shoulder  4. Pt to tolerate 120 minutes continuous activity in the clinic without increase in pain       Plan  Therapy options: will be seen for skilled therapy services  Planned modality interventions: cryotherapy, electrical stimulation/Russian stimulation, high voltage pulsed current (pain management), iontophoresis, microcurrent electrical stimulation, TENS, thermotherapy (hydrocollator packs) and ultrasound  Planned therapy interventions: abdominal trunk stabilization, ADL retraining, body mechanics training, flexibility, functional ROM exercises, home exercise program, IADL retraining, joint mobilization, manual therapy, neuromuscular re-education,  postural training, soft tissue mobilization, spinal/joint mobilization, strengthening, stretching and therapeutic activities  Frequency: 2x week  Duration in weeks: 6  Treatment plan discussed with: patient        History # of Personal Factors and/or Comorbidities: LOW (0)  Examination of Body System(s): # of elements: LOW (1-2)  Clinical Presentation: STABLE   Clinical Decision Making: LOW       Timed:         Manual Therapy:         mins  00449;     Therapeutic Exercise:         mins  61491;     Neuromuscular Len:        mins  90653;    Therapeutic Activity:          mins  05043;     Gait Training:           mins  82205;     Ultrasound:          mins  95344;    Ionto                                   mins   48751  Self Care                            mins   94586  Canalith Repos         mins 50345      Un-Timed:  Electrical Stimulation:         mins  60198 (MC );  Dry Needling          mins self-pay  Traction          mins 20967  Low Eval     36     Mins  58384  Mod Eval          Mins  28303  High Eval                            Mins  82968        Timed Treatment:   0   mins   Total Treatment:     36   mins          PT: Octavio Dyer PT, DPT, OCS, Cert. DN   License Number: [id number]  Electronically signed by Octavio Dyer PT, 01/27/25, 12:10 PM EST    Certification Period: 1/27/2025 thru 4/26/2025  I certify that the therapy services are furnished while this patient is under my care.  The services outlined above are required by this patient, and will be reviewed every 90 days.         Physician Signature:__________________________________________________    PHYSICIAN: Florinda Owen PA-C  NPI: [id number]                                      DATE:      Please sign and return via fax to [email or web address] . Thank you, [facility].

## 2025-02-04 ENCOUNTER — TREATMENT (OUTPATIENT)
Dept: PHYSICAL THERAPY | Facility: CLINIC | Age: 45
End: 2025-02-04
Payer: OTHER MISCELLANEOUS

## 2025-02-04 DIAGNOSIS — R29.898 WEAKNESS OF RIGHT SHOULDER: ICD-10-CM

## 2025-02-04 DIAGNOSIS — G89.29 CHRONIC RIGHT SHOULDER PAIN: Primary | ICD-10-CM

## 2025-02-04 DIAGNOSIS — M25.511 CHRONIC RIGHT SHOULDER PAIN: Primary | ICD-10-CM

## 2025-02-04 DIAGNOSIS — M25.611 STIFFNESS OF RIGHT SHOULDER JOINT: ICD-10-CM

## 2025-02-04 PROCEDURE — 97545 WORK HARDENING: CPT | Performed by: PHYSICAL THERAPIST

## 2025-02-11 ENCOUNTER — OFFICE VISIT (OUTPATIENT)
Age: 45
End: 2025-02-11
Payer: OTHER MISCELLANEOUS

## 2025-02-11 ENCOUNTER — TREATMENT (OUTPATIENT)
Dept: PHYSICAL THERAPY | Facility: CLINIC | Age: 45
End: 2025-02-11
Payer: OTHER MISCELLANEOUS

## 2025-02-11 VITALS
BODY MASS INDEX: 29.13 KG/M2 | HEIGHT: 71 IN | DIASTOLIC BLOOD PRESSURE: 84 MMHG | SYSTOLIC BLOOD PRESSURE: 128 MMHG | WEIGHT: 208.1 LBS

## 2025-02-11 DIAGNOSIS — G89.29 CHRONIC RIGHT SHOULDER PAIN: Primary | ICD-10-CM

## 2025-02-11 DIAGNOSIS — Z98.890 S/P RIGHT ROTATOR CUFF REPAIR: Primary | ICD-10-CM

## 2025-02-11 DIAGNOSIS — R29.898 WEAKNESS OF RIGHT SHOULDER: ICD-10-CM
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
## 2025-02-11 DIAGNOSIS — M25.611 STIFFNESS OF RIGHT SHOULDER JOINT: ICD-10-CM

## 2025-02-11 DIAGNOSIS — M25.511 CHRONIC RIGHT SHOULDER PAIN: Primary | ICD-10-CM

## 2025-02-11 PROCEDURE — 97545 WORK HARDENING: CPT | Performed by: PHYSICAL THERAPIST

## 2025-02-11 NOTE — PROGRESS NOTES
[facility] Office Follow Up       Office Follow Up Visit       Patient Name: Heladio Guadalupe    Chief Complaint:   Chief Complaint   Patient presents with   • Follow-up     6 week f/u-- 9 months s/p Arthroscopic right rotator cuff repair;Arthroscopic subacromial decompression with acromioplasty 5/17/24       Referring Physician: Provider, No Known    History of Present Illness:   Heladio Guadalupe returns to clinic today for follow-up visit 9 months s/p right arthroscopic rotator cuff repair, subacromial decompression with Dr. Arrieta.  He says the shoulder continues to improve in pain and range of motion.  Overall feels right shoulder is significantly better compared to before surgery.  He has some difficulties still with lifting out to the side of the body and overhead.  He has been on work restrictions including no lifting more than 15 pounds with the right upper extremity.    Recommended work hardening program last visit.  He has been to 2 sessions with Octavio so far.  Scheduled to continue.    RIGHT SHOULDER  WORK INJURY  Rotator cuff tear  Exhausted nonoperative treatment with PT     Employer: [employer]  Job at work: manual labor, heavy duty  Date of Injury at Work: 9/22/2023     Mechanism of Injury at Work: He notes he was working with a grout pump hose that was clogged and 3 different times it jerked and finally his right arm gave out with acute pain to the shoulder     Procedure:  1.     Arthroscopic rotator cuff repair (1x1) - CPT 94544  2.     Arthroscopic subacromial decompression with acromioplasty - CPT 63527    Subjective     Review of Systems   Constitutional: Negative.  Negative for chills, fatigue and fever.   HENT: Negative.  Negative for congestion and dental problem.    Eyes: Negative.  Negative for blurred vision.   Respiratory: Negative.  Negative for shortness of breath.    Cardiovascular: Negative.  Negative  "for leg swelling.   Gastrointestinal: Negative.  Negative for abdominal pain.   Endocrine: Negative.  Negative for polyuria.   Genitourinary: Negative.  Negative for difficulty urinating.   Musculoskeletal:  Positive for arthralgias.   Skin: Negative.    Allergic/Immunologic: Negative.    Neurological: Negative.    Hematological: Negative.  Negative for adenopathy.   Psychiatric/Behavioral: Negative.  Negative for behavioral problems.         I have reviewed and updated the following portions of the patient's history and review of systems: allergies, current medications, past family history, past medical history, past social history, past surgical history and problem list.    Medications:   Current Outpatient Medications:   •  ibuprofen (ADVIL,MOTRIN) 800 MG tablet, Take 1 tablet by mouth Every 8 (Eight) Hours As Needed for Mild Pain., Disp: 90 tablet, Rfl: 1    Allergies: No Known Allergies      Objective      Vital Signs:   Vitals:    02/11/25 0759   BP: 128/84   Weight: 94.4 kg (208 lb 1.6 oz)   Height: 180 cm (70.87\")       Ortho Exam:  General: no acute distress, comfortable  Vitals reviewed in chart    Musculoskeletal Exam:    SIDE: Right shoulder    Range of motion measurements (degrees): Excellent range of motion of the right shoulder in forward flexion and abduction.  Approximately 160/160. Symmetric compared to left side.  Excellent bicep strength  Improving rotator cuff strength in abduction and overhead  Painful arc of motion: no  Incisions well-healed  No evidence of septic joint      Results Review:  XR Shoulder 1 View Right  Imaging: shoulder x-rays 1 view - AP x-ray views    Side: RIGHT SHOULDER    Indication for shoulder x-ray 1 view: shoulder pain, postop evaluation   following surgery    Comparison: none    Findings: No acute bony pathology. Well appearing and well positioned   compared to preoperative imaging.  Located and no fracture noted.    I personally reviewed the above x-rays.       I have " noted results reviewed from previous encounter.      Assessment / Plan      Assessment:   Diagnoses and all orders for this visit:    1. S/P right rotator cuff repair (Primary)  -     Ambulatory Referral to Physical Therapy for Evaluation & Treatment    2. Work related injury  -     Ambulatory Referral to Physical Therapy for Evaluation & Treatment        Quality Metrics:   BMI:   BMI is >= 25 and <30. (Overweight) The following options were offered after discussion;: weight loss educational material (shared in after visit summary)       Tobacco:   Heladio Guadalupe  reports that he has been smoking cigarettes. He started smoking about 30 years ago. He has a 59.8 pack-year smoking history. He has been exposed to tobacco smoke. He has never used smokeless tobacco.       Plan:  9 months s/p right rotator cuff repair and subacromial decompression.  Patient endorses improvements compared to before surgery.  Excellent range of motion on exam.  Strength is gradually getting better.  Not yet ready for full duty release as he will be required to lift heavy bags overhead.  Recommend additional work hardening for 6 weeks.  Continue work restrictions including no lifting more than 15 pounds with the right upper extremity.    Follow Up:   6 weeks    Florinda Owen PA-C  [facility]    Dictated using Dragon Speech Recognition.

## 2025-02-11 NOTE — PROGRESS NOTES
Physical Therapy Daily Treatment Note    [facility]   [address], [address] [place] [address]      Patient: Heladio Guadalupe   : 1980  Referring practitioner: Florinda Owen PA-C  Date of Initial Visit: Type: THERAPY  Noted: 2025  Today's Date: 2025  Patient seen for 2 sessions    Certification Period 2025 thru 2025       Visit Diagnoses:    ICD-10-CM ICD-9-CM   1. Chronic right shoulder pain  M25.511 719.41    G89.29 338.29   2. Weakness of right shoulder  R29.898 781.99   3. Stiffness of right shoulder joint  M25.611 719.51       Subjective     Pt states that his shoulder feels good at rest but he still have issues with heavy OH lifting and with raising the right arm up the side in a smooth ROM without pain. He is limited with his ability to perform all necessary work tasks.     Objective   See Exercise, Manual, and Modality Logs for complete treatment.       Assessment/Plan     Pt was able to perform heavier functional lifting in the clinic without a significant increase in pain in the shoulder. He is still limited in his ability to move the arm through a full ROM into abduction, but his ability to control scapular position and safely compensate for limitation when lifting is significantly improved since his previous round of therapy here. Will cont to progress as indicated.      Heladio Guadalupe will continue to benefit from skilled physical therapy services to address deficits and continue to work towards reaching functional goals.           Timed:         Work conditionin  -  120 min       Timed Treatment:   120   mins   Total Treatment:     120   mins    Octavio Dyer, PT, DPT, Cert. [facility] License: [id number]

## 2025-02-13 NOTE — PROGRESS NOTES
Physical Therapy Daily Treatment Note    [facility]   [address], [address] [place] [address]      Patient: Heladio Guadalupe   : 1980  Referring practitioner: Florinda Owen PA-C  Date of Initial Visit: Type: THERAPY  Noted: 2025  Today's Date: 2025  Patient seen for 3 sessions    Certification Period 2025 thru 2025       Visit Diagnoses:    ICD-10-CM ICD-9-CM   1. Chronic right shoulder pain  M25.511 719.41    G89.29 338.29   2. Weakness of right shoulder  R29.898 781.99   3. Stiffness of right shoulder joint  M25.611 719.51       Subjective     Pt states that he feels like he has been doing well and he has not had any significant increase in pain with his usual daily activities. He did not have an increase in pain in the shoulder after his previous session, but he did experience some increase in soreness and fatigue.     Objective   See Exercise, Manual, and Modality Logs for complete treatment.       Assessment/Plan     Pt demonstrates an improvement in his shoulder strength with functional activity in the clinic and he did not have an exacerbation of pain in the shoulder with increased resistance. Will cont to progress activity as indicated.     Heladio Guadalupe will continue to benefit from skilled physical therapy services to address deficits and continue to work towards reaching functional goals.           Timed:         Work conditionin - 120 min       Timed Treatment:   120   mins   Total Treatment:     120   mins    Octavio Dyer, PT, DPT, Cert. DN  [place] License: [id number]

## 2025-02-18 ENCOUNTER — TREATMENT (OUTPATIENT)
Dept: PHYSICAL THERAPY | Facility: CLINIC | Age: 45
End: 2025-02-18
Payer: OTHER MISCELLANEOUS

## 2025-02-18 DIAGNOSIS — M25.511 CHRONIC RIGHT SHOULDER PAIN: Primary | ICD-10-CM

## 2025-02-18 DIAGNOSIS — R29.898 WEAKNESS OF RIGHT SHOULDER: ICD-10-CM

## 2025-02-18 DIAGNOSIS — M25.611 STIFFNESS OF RIGHT SHOULDER JOINT: ICD-10-CM

## 2025-02-18 DIAGNOSIS — G89.29 CHRONIC RIGHT SHOULDER PAIN: Primary | ICD-10-CM

## 2025-02-18 PROCEDURE — 97545 WORK HARDENING: CPT | Performed by: PHYSICAL THERAPIST

## 2025-02-19 ENCOUNTER — TELEPHONE (OUTPATIENT)
Dept: ORTHOPEDIC SURGERY | Facility: CLINIC | Age: 45
End: 2025-02-19

## 2025-02-19 NOTE — TELEPHONE ENCOUNTER
Caller: [employer]    Relationship:     Best call back number: [phone]     What form or medical record are you requesting: OFFICE NOTE AND WORK STATUS UPDATE    Who is requesting this form or medical record from you: [employer]    How would you like to receive the form or medical records (pick-up, mail, fax): FAX    If fax, what is the fax number: [phone]      Timeframe paperwork needed: ASAP    Additional notes: GO CALLING TO REQUEST OFFICE NOTE FOR [employer].

## 2025-02-25 ENCOUNTER — TREATMENT (OUTPATIENT)
Dept: PHYSICAL THERAPY | Facility: CLINIC | Age: 45
End: 2025-02-25
Payer: OTHER MISCELLANEOUS

## 2025-02-25 DIAGNOSIS — G89.29 CHRONIC RIGHT SHOULDER PAIN: Primary | ICD-10-CM

## 2025-02-25 DIAGNOSIS — M25.611 STIFFNESS OF RIGHT SHOULDER JOINT: ICD-10-CM

## 2025-02-25 DIAGNOSIS — M25.511 CHRONIC RIGHT SHOULDER PAIN: Primary | ICD-10-CM

## 2025-02-25 DIAGNOSIS — R29.898 WEAKNESS OF RIGHT SHOULDER: ICD-10-CM

## 2025-02-25 PROCEDURE — 97545 WORK HARDENING: CPT | Performed by: PHYSICAL THERAPIST

## 2025-02-26 NOTE — PROGRESS NOTES
Physical Therapy Daily Treatment Note    [facility]   [address], [address] [place] [address]      Patient: Heladio Guadalupe   : 1980  Referring practitioner: Florinda Owen PA-C  Date of Initial Visit: Type: THERAPY  Noted: 2025  Today's Date: 2025  Patient seen for 4 sessions    Certification Period 2025 thru 2025       Visit Diagnoses:    ICD-10-CM ICD-9-CM   1. Chronic right shoulder pain  M25.511 719.41    G89.29 338.29   2. Weakness of right shoulder  R29.898 781.99   3. Stiffness of right shoulder joint  M25.611 719.51       Subjective     Pt reports that he has been doing more frequent lifting at work and he feels that his shoulde strength has improved since resuming therapy. He deneis any pain at this time.     Objective   See Exercise, Manual, and Modality Logs for complete treatment.       Assessment/Plan     Pt is progressing well and he demonstrates improvement in his abiity to perform functional lifting. Pt is able to lift 50# OH and is improving his strength and RROM into abduction with the R shoulder. Will cont to progress as indicated.      Heladio Guadalupe will continue to benefit from skilled physical therapy services to address deficits and continue to work towards reaching functional goals.           Timed:           Work conditionin - 120 min     Timed Treatment:   120   mins   Total Treatment:     120   mins    Octavio Dyer, PT, DPT, Cert. DN  [place] License: [id number]

## 2025-02-27 NOTE — PROGRESS NOTES
Physical Therapy Daily Treatment Note    [facility]   [address] [address], [address] [place] [address]      Patient: Heladio Guadalupe   : 1980  Referring practitioner: Florinda Owen PA-C  Date of Initial Visit: Type: THERAPY  Noted: 2025  Today's Date: 2025  Patient seen for 5 sessions    Certification Period 2025 thru 2025       Visit Diagnoses:    ICD-10-CM ICD-9-CM   1. Chronic right shoulder pain  M25.511 719.41    G89.29 338.29   2. Weakness of right shoulder  R29.898 781.99   3. Stiffness of right shoulder joint  M25.611 719.51       Subjective     Patient states that he continues to feel improvement in the right shoulder and he has been able to consistently perform more work-related activity without limitations without increasing pain over the past several weeks.    Objective   See Exercise, Manual, and Modality Logs for complete treatment.       Assessment/Plan     Continue with progression of functional and work-related exercise in the clinic.  Patient demonstrates ability to lift overhead and left with his arm into abduction without any increase in pain.  He continues to have some limitation with isolated abduction strength. Will cont to progress as indicated.     Heladio Guadalupe will continue to benefit from skilled physical therapy services to address deficits and continue to work towards reaching functional goals.         Timed:           Work conditionin - 120 min     Timed Treatment:   120   mins   Total Treatment:     120   mins    Octavio Dyer, PT, DPT, Cert. DN  [place] License: [id number]

## 2025-03-04 ENCOUNTER — TREATMENT (OUTPATIENT)
Dept: PHYSICAL THERAPY | Facility: CLINIC | Age: 45
End: 2025-03-04
Payer: OTHER MISCELLANEOUS

## 2025-03-04 DIAGNOSIS — G89.29 CHRONIC RIGHT SHOULDER PAIN: Primary | ICD-10-CM

## 2025-03-04 DIAGNOSIS — R29.898 WEAKNESS OF RIGHT SHOULDER: ICD-10-CM

## 2025-03-04 DIAGNOSIS — M25.511 CHRONIC RIGHT SHOULDER PAIN: Primary | ICD-10-CM

## 2025-03-04 DIAGNOSIS — M25.611 STIFFNESS OF RIGHT SHOULDER JOINT: ICD-10-CM

## 2025-03-04 PROCEDURE — 97545 WORK HARDENING: CPT | Performed by: PHYSICAL THERAPIST

## 2025-03-07 NOTE — PROGRESS NOTES
Physical Therapy Daily Treatment Note    [facility]   [address], [address] [place] [address]      Patient: Heladio Guadalupe   : 1980  Referring practitioner: Florinda Owen PA-C  Date of Initial Visit: Type: THERAPY  Noted: 2025  Today's Date: 3/4/2025  Patient seen for 6 sessions    Certification Period 3/4/2025 thru 6/3/2025       Visit Diagnoses:    ICD-10-CM ICD-9-CM   1. Chronic right shoulder pain  M25.511 719.41    G89.29 338.29   2. Weakness of right shoulder  R29.898 781.99   3. Stiffness of right shoulder joint  M25.611 719.51       Subjective     Patient states that he feels like he is continue to have improvement in his right shoulder strength and he has progressively been doing more activity at work right upper extremity without having exacerbation of symptoms.    Objective   See Exercise, Manual, and Modality Logs for complete treatment.       Assessment/Plan     Patient continues to be able to lift heavy weight overhead and with other functional activities without having an exacerbation of symptoms in the shoulder.  He remains most limited and resisted activity into abduction but is making progress in this regard as well.  Continue to progress as indicated.    Heladio Guadalupe will continue to benefit from skilled physical therapy services to address deficits and continue to work towards reaching functional goals.       Work conditionin - 120 min     Timed Treatment:   120   mins   Total Treatment:     120   mins      Octavio Dyer, PT, DPT, Cert. DN  [place] License: [id number]

## 2025-03-11 ENCOUNTER — TREATMENT (OUTPATIENT)
Dept: PHYSICAL THERAPY | Facility: CLINIC | Age: 45
End: 2025-03-11
Payer: OTHER MISCELLANEOUS

## 2025-03-11 DIAGNOSIS — R29.898 WEAKNESS OF RIGHT SHOULDER: ICD-10-CM

## 2025-03-11 DIAGNOSIS — M25.611 STIFFNESS OF RIGHT SHOULDER JOINT: ICD-10-CM

## 2025-03-11 DIAGNOSIS — M25.511 CHRONIC RIGHT SHOULDER PAIN: Primary | ICD-10-CM

## 2025-03-11 DIAGNOSIS — G89.29 CHRONIC RIGHT SHOULDER PAIN: Primary | ICD-10-CM

## 2025-03-11 PROCEDURE — 97545 WORK HARDENING: CPT | Performed by: PHYSICAL THERAPIST

## 2025-03-11 NOTE — PROGRESS NOTES
Physical Therapy Daily Treatment Note    [facility]   [address], [address] [place] [address]      Patient: Heladio Guadalupe   : 1980  Referring practitioner: Florinda Owen PA-C  Date of Initial Visit: Type: THERAPY  Noted: 2025  Today's Date: 3/11/2025  Patient seen for 7 sessions    Certification Period 3/11/2025 thru 2025       Visit Diagnoses:    ICD-10-CM ICD-9-CM   1. Chronic right shoulder pain  M25.511 719.41    G89.29 338.29   2. Weakness of right shoulder  R29.898 781.99   3. Stiffness of right shoulder joint  M25.611 719.51       Subjective     Pt states that he feels like he is improving with treatment and he has been able to do most work activities without an increase in pain. He does still have some limitation with being able to lift scaffolding, but this has been improved as well.     Objective   See Exercise, Manual, and Modality Logs for complete treatment.       Assessment/Plan     Continue to progress functional activity and overall right shoulder strength in the clinic for work conditioning.  Patient demonstrates ability to perform 50 pound lift overhead with bilateral upper extremities and is able to perform wide  shoulder flexion lift using the weight stack as well.  Patient has some fatigue with progressive overloading of the right shoulder and with progression of strengthening activities but he denies having any pain in the shoulders as a result of treatment.  Will continue to progress as indicated to achieve the highest level of function before being released to return to work.    Heladio Guadalupe will continue to benefit from skilled physical therapy services to address deficits and continue to work towards reaching functional goals.       Work conditionin - 120 min     Timed Treatment:   120   mins   Total Treatment:     120   mins    Octavio Dyer, PT, DPT, Cert. DN  [place] License: [id number]

## 2025-03-18 ENCOUNTER — TREATMENT (OUTPATIENT)
Dept: PHYSICAL THERAPY | Facility: CLINIC | Age: 45
End: 2025-03-18
Payer: OTHER MISCELLANEOUS

## 2025-03-18 DIAGNOSIS — M25.611 STIFFNESS OF RIGHT SHOULDER JOINT: ICD-10-CM

## 2025-03-18 DIAGNOSIS — M25.511 CHRONIC RIGHT SHOULDER PAIN: Primary | ICD-10-CM

## 2025-03-18 DIAGNOSIS — R29.898 WEAKNESS OF RIGHT SHOULDER: ICD-10-CM

## 2025-03-18 DIAGNOSIS — G89.29 CHRONIC RIGHT SHOULDER PAIN: Primary | ICD-10-CM

## 2025-03-18 PROCEDURE — 97545 WORK HARDENING: CPT | Performed by: PHYSICAL THERAPIST

## 2025-03-18 NOTE — PROGRESS NOTES
Physical Therapy Daily Treatment Note    [facility]   [address], [address] [place] [address]      Patient: Heladio Guadalupe   : 1980  Referring practitioner: Florinda Owen PA-C  Date of Initial Visit: Type: THERAPY  Noted: 2025  Today's Date: 3/18/2025  Patient seen for 8 sessions    Certification Period 3/18/2025 thru 2025       Visit Diagnoses:    ICD-10-CM ICD-9-CM   1. Chronic right shoulder pain  M25.511 719.41    G89.29 338.29   2. Weakness of right shoulder  R29.898 781.99   3. Stiffness of right shoulder joint  M25.611 719.51       Subjective     Patient states that he feels like he has made significant progress with work conditioning and he has been able to perform heavy lifting and prolonged activity while working.  Patient denies having any exacerbation of pain or excessive soreness after therapy sessions.  Patient is progressing well and demonstrates improvement in his overall strength.  Patient is able to    Objective          Strength/Myotome Testing     Additional Strength Details  Left flexion- 35#    Right IR- 25#  Left IR- 26#    Right ER- 18#  Left ER- 18#    Right abd- 0*- 25#, 80*- 15#  Left abd- 0* - 28#    See Exercise, Manual, and Modality Logs for complete treatment.       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  Patient should be ready for return to work full duty at this time but we will proceed based on MD recommendations.    Heladio Guadalupe will continue to benefit from skilled physical therapy services to address deficits and continue to work towards reaching functional goals.       Work conditionin - 120 min     Timed Treatment:   120   mins   Total Treatment:     120   mins    Elissa Wade PTA Student       I have reviewed and approved all documentation provided in this note.  All treatment has been provided under the direct  supervision of physical therapist.      Octavio Dyer, PT  Physical Therapist

## 2025-03-25 ENCOUNTER — OFFICE VISIT (OUTPATIENT)
Age: 45
End: 2025-03-25
Payer: OTHER MISCELLANEOUS

## 2025-03-25 VITALS
BODY MASS INDEX: 28.99 KG/M2 | HEIGHT: 71 IN | WEIGHT: 207.1 LBS | SYSTOLIC BLOOD PRESSURE: 102 MMHG | DIASTOLIC BLOOD PRESSURE: 64 MMHG

## 2025-03-25 DIAGNOSIS — Z98.890 S/P RIGHT ROTATOR CUFF REPAIR: Primary | ICD-10-CM
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
## 2025-03-25 PROCEDURE — 99213 OFFICE O/P EST LOW 20 MIN: CPT

## 2025-03-25 NOTE — PROGRESS NOTES
[facility] Up       Office Follow Up Visit       Patient Name: Heladio Guadalupe    Chief Complaint:   Chief Complaint   Patient presents with    Follow-up     6 week follow up right rotator cuff repair       Referring Physician: No ref. provider found    History of Present Illness:   Heladio Guadalupe returns to clinic today for follow-up visit 10 months s/p right arthroscopic rotator cuff repair and subacromial decompression with Dr. Arrieta.  He has been completing work conditioning program with Octavio Dyer since last visit.  Reports excellent improvements in right shoulder range of motion and strength.  Now able to lift over 50 pounds with the right shoulder overhead.  Feels ready to return full duty. He has been on light duty restrictions.    RIGHT SHOULDER  WORK INJURY  Rotator cuff tear  Exhausted nonoperative treatment with PT     Employer: [employer]  Job at work: manual labor, heavy duty  Date of Injury at Work: 9/22/2023     Mechanism of Injury at Work: He notes he was working with a grout pump hose that was clogged and 3 different times it jerked and finally his right arm gave out with acute pain to the shoulder    Date of surgery 5/17/24  Procedure:  1.     Arthroscopic rotator cuff repair (1x1) - CPT 74044  2.     Arthroscopic subacromial decompression with acromioplasty - CPT 75441    Subjective     Review of Systems   Constitutional:  Negative for chills, fever, unexpected weight gain and unexpected weight loss.   HENT:  Negative for congestion, postnasal drip and rhinorrhea.    Eyes:  Negative for blurred vision.   Respiratory:  Negative for shortness of breath.    Cardiovascular:  Negative for leg swelling.   Gastrointestinal:  Negative for abdominal pain, nausea and vomiting.   Genitourinary:  Negative for difficulty urinating.   Musculoskeletal:  Positive for arthralgias. Negative for gait problem,  "joint swelling and myalgias.   Skin:  Negative for skin lesions and wound.   Neurological:  Negative for dizziness, weakness, light-headedness and numbness.   Hematological:  Does not bruise/bleed easily.   Psychiatric/Behavioral:  Negative for depressed mood.         I have reviewed and updated the following portions of the patient's history and review of systems: allergies, current medications, past family history, past medical history, past social history, past surgical history and problem list.    Medications:   Current Outpatient Medications:     ibuprofen (ADVIL,MOTRIN) 800 MG tablet, Take 1 tablet by mouth Every 8 (Eight) Hours As Needed for Mild Pain., Disp: 90 tablet, Rfl: 1    Allergies: No Known Allergies      Objective      Vital Signs:   Vitals:    03/25/25 0909   BP: 102/64   Weight: 93.9 kg (207 lb 1.6 oz)   Height: 180 cm (70.87\")       Ortho Exam:  General: no acute distress, comfortable  Vitals reviewed in chart    Musculoskeletal Exam:    SIDE: Right shoulder    Tenderness: No tenderness    Range of motion measurements (degrees): 160/160/60/60  Symmetric range of motion and strength on exam compared to left side  Excellent rotator cuff strength  Painful arc of motion: No  No evidence of septic joint      Results Review:  XR Shoulder 1 View Right  Imaging: shoulder x-rays 1 view - AP x-ray views    Side: RIGHT SHOULDER    Indication for shoulder x-ray 1 view: shoulder pain, postop evaluation   following surgery    Comparison: none    Findings: No acute bony pathology. Well appearing and well positioned   compared to preoperative imaging.  Located and no fracture noted.    I personally reviewed the above x-rays.       I have noted results reviewed from previous encounter.      Assessment / Plan      Assessment:   Diagnoses and all orders for this visit:    1. S/P right rotator cuff repair (Primary)    2. Work related injury        Quality Metrics:   BMI:   BMI is >= 25 and <30. (Overweight) The " following options were offered after discussion;: weight loss educational material (shared in after visit summary)       Tobacco:   Heladio Guadalupe  reports that he has been smoking cigarettes. He started smoking about 30 years ago. He has a 59.8 pack-year smoking history. He has been exposed to tobacco smoke. He has never used smokeless tobacco.          Plan:  10 months s/p right arthroscopic rotator cuff repair and subacromial decompression.  He has made excellent progress with right shoulder range of motion and strengthening over the last 6 weeks with Octavio.  He is eager to return full duty at work. Recommend full duty release today. He may discontinue physical therapy.  We will reassess in 6 weeks to ensure return to work does not cause issue. He will keep us updated before then if he has any problems.      Follow Up:   6 weeks-- target MMI at that time    Florinda Owen PA-C  [facility] Orthopedic [facility]    Dictated using Dragon Speech Recognition.

## 2025-05-06 ENCOUNTER — OFFICE VISIT (OUTPATIENT)
Age: 45
End: 2025-05-06
Payer: OTHER MISCELLANEOUS

## 2025-05-06 VITALS — WEIGHT: 202.1 LBS | SYSTOLIC BLOOD PRESSURE: 118 MMHG | BODY MASS INDEX: 28.29 KG/M2 | DIASTOLIC BLOOD PRESSURE: 85 MMHG
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
## 2025-05-06 DIAGNOSIS — Z98.890 S/P RIGHT ROTATOR CUFF REPAIR: Primary | ICD-10-CM

## 2025-05-06 NOTE — PROGRESS NOTES
[facility] [place] [facility] Office Follow Up - Isac Arrieta MD  [facility]    Office Follow Up Visit       Patient Name: Heladio Guadalupe    Chief Complaint:   Chief Complaint   Patient presents with    Post-op     6 week recheck - Status post Arthroscopic right rotator cuff repair;Arthroscopic subacromial decompression with acromioplasty 5/17/24       Referring Physician: Provider, No Known  - I appreciate the referral    History of Present Illness:   It has been 6  week(s) since Heladio Guadalupe's last visit. Heladio Guadalupe returns to clinic today for F/U: follow-up of rightBody Part: shoulderReason: arthroscopy. The issue has been ongoing for 1 year(s). Heladio Guadalupe rates HIS/HER: hispain at 0/10 on the pain scale. Previous/current treatments: NSAIDS and physical therapy. Overall, he/she: heis doing better. I have reviewed the patient's history of present illness as noted/entered above.    I have reviewed the patient's past medical history, surgical history, social history, family history, medications, and allergies as noted in the electronic medical record and as noted/entered.  I have reviewed the patient's review of systems as noted/enter and updated as noted in the patient's HPI.      5/6/2025:  Date of Surgery: 5/17/2024  Shoulder: Right shoulder   RIGHT SHOULDER ACUTE INJURY WORK INJURY     WORKERS' COMPENSATION INJURY  Employer: [employer]  Job at work: manual labor, heavy duty  Date of Injury at Work: 9/22/2023 -- approximately 6-7 weeks prior to my first evaluation      20 months post-injury  1 year post-surgery  Improved/better  1 year postop  Heavy manual duty labor  Full duty release was 3/25/2025  PT Octavio Winkler    Excellent recovery and doing well with full duty there has been some soreness along the way but has been able to pull himself up out of a [place], perform his full  duty with no issues he is excited to remain in continue with full duty.  He understands we will see him back on an as-needed basis at this point      Date of Surgery: 5/17/2024  Shoulder: Right shoulder   Preoperative Diagnosis:  1.     Rotator cuff tear - treated with arthroscopic rotator cuff repair - CPT 91906  2.     Shoulder impingement syndrome - treated with arthroscopic subacromial decompression with acromioplasty - CPT 41278     Postoperative Diagnosis:  1.     SAME as preoperative diagnoses     Procedure:  1.     Arthroscopic rotator cuff repair (1x1) - CPT 05702  2.     Arthroscopic subacromial decompression with acromioplasty - CPT 36542         RIGHT SHOULDER ACUTE INJURY WORK INJURY     WORKERS' COMPENSATION INJURY  Employer: [employer]  Job at work: manual labor, heavy duty  Date of Injury at Work: 9/22/2023 -- approximately 6-7 weeks prior to my first evaluation  [facility] occupational medicine note from Dr. Lindsey reviewed from 10/19/2023 --I agree with his assessment that the patient may have an acute work-related rotator cuff injury.  MRI indicated at this time.     Mechanism of Injury at Work: He notes he was working with a grout pump hose that was clogged and 3 different times it jerked and finally his right arm gave out with acute pain to the shoulder.     Right-hand-dominant  Current Work Status: Light duty  TREATMENTS: Activity modifications, self exercise, work modifications  Diagnostic Tests: X-rays reviewed-no acute bony findings     [place]  Working on a [employer]           42 y.o. male  Body mass index is 26.19 kg/m².        12/12/2023:  RIGHT SHOULDER  Work injury  Feels the same as prior  MRI COMPLETED  He presented essentially pseudoparalytic in the right shoulder but fortunately clinically does appear to be somewhat better today.  MRI was reassuring as well because we were expecting possible large massive avulsion but he has a small  partial bursal tear.  In light of these findings we counseled on operative and nonoperative measures I am very hopeful that he will respond to conservative course.  It can be frustrating and a work compensation scenario as the hope would be to restored to the prior preinjury state however in this case the partial tearing would likely not indicate a need for operative intervention although the partial tearing can persist.  The tearing is more intrasubstance and more medial rather than at the bone tendon interface.     Formal physical therapy is recommended at this time     [facility]     PRIOR COUNSELING:  Treatment Plan: PT Rx     Counseled on operative versus nonoperative measures.  Nonoperative measures recommended.  Small partial intrasubstance and bursal sided fraying of the rotator cuff.        Counseled on the importance of work providing him time to perform his physical therapy so that he can have ample time to strengthen the shoulder and to recover.     Work status:   Work status:   RIGHT ARM  10 lbs lifting restriction with the affected arm  No overhead lifting with the affected arm  No repetitive lift push pull with the affected arm           2/13/2024:  RIGHT SHOULDER  Improved/better  Almost 5 months post-injury  WORKERS' COMPENSATION INJURY  Employer: [employer]  Job at work: manual labor, heavy duty  Date of Injury at Work: 9/22/2023 -- approximately 6-7 weeks prior to my first evaluation  [facility] occupational medicine note from Dr. Lindsey reviewed from 10/19/2023      PT: Shirley Valle --- Constantine is helping him to make excellent gains at this time and he will continue to benefit from physical therapy services.     Improvements of the right shoulder noted he is no longer pseudoparalytic but still has impingement and pain with abduction still feels some weakness will continue to benefit from physical therapy        4/16/2024:  RIGHT SHOULDER  WORK INJURY  MRI re-reviewed  today  Corresponded with [facility], PT -- plateau'd with PT -- no longer see prior improvements, pain and dysfunction persist  Some anterior biceps pain -- SLAP tear on MRI  Rotator cuff tear  Failed MD guided Physical therapy     MRI Shoulder Right Without Contrast     Result Date: 12/11/2023  Impression: Irregular moderate-grade bursal-sided and interstitial tearing of the mid distal fibers of the supraspinatus tendon at and immediately proximal to the footprint. Focal tear of the posterior superior labrum. Electronically Signed: Pavan Mcnally MD  12/11/2023 10:31 AM EST  Workstation ID: [id number]     I personally reviewed and personally interpreted the imaging above.     [facility][note 3] contest in [place] - finals Jan 2025         Subjective   Subjective      Review of Systems   Constitutional: Negative.  Negative for chills, fatigue and fever.   HENT: Negative.  Negative for congestion and dental problem.    Eyes: Negative.  Negative for blurred vision.   Respiratory: Negative.  Negative for shortness of breath.    Cardiovascular: Negative.  Negative for leg swelling.   Gastrointestinal: Negative.  Negative for abdominal pain.   Endocrine: Negative.  Negative for polyuria.   Genitourinary: Negative.  Negative for difficulty urinating.   Musculoskeletal:  Positive for arthralgias.   Skin: Negative.    Allergic/Immunologic: Negative.    Neurological: Negative.    Hematological: Negative.  Negative for adenopathy.   Psychiatric/Behavioral: Negative.  Negative for behavioral problems.    All other systems reviewed and are negative.       Past Medical History:   Past Medical History:   Diagnosis Date    Rotator cuff syndrome        Past Surgical History:   Past Surgical History:   Procedure Laterality Date    FRACTURE SURGERY Left 2001    left arm    ROTATOR CUFF REPAIR Right 05/17/2024    Arthroscopic subacromial decompression with acromioplasty Dr. Isac smith       Family History: History reviewed. No pertinent  family history.    Social History:   Social History     Socioeconomic History    Marital status: Single   Tobacco Use    Smoking status: Every Day     Current packs/day: 0.50     Average packs/day: 2.0 packs/day for 30.3 years (59.9 ttl pk-yrs)     Types: Cigarettes     Start date: 1/9/1995     Passive exposure: Current    Smokeless tobacco: Never   Vaping Use    Vaping status: Never Used   Substance and Sexual Activity    Alcohol use: Never    Drug use: Never    Sexual activity: Yes     Partners: Female     Birth control/protection: Condom       Medications:   Current Outpatient Medications:     ibuprofen (ADVIL,MOTRIN) 800 MG tablet, Take 1 tablet by mouth Every 8 (Eight) Hours As Needed for Mild Pain., Disp: 90 tablet, Rfl: 1    Allergies: No Known Allergies    The following portions of the patient's history were reviewed and updated as appropriate: allergies, current medications, past family history, past medical history, past social history, past surgical history and problem list.        Objective    Objective      Vital Signs:   Vitals:    05/06/25 0753   BP: 118/85   Weight: 91.7 kg (202 lb 1.6 oz)       Ortho Exam:  Right shoulder shows symmetric arc of motion relative to the contralateral side 5 out of 5 strength throughout healed arthroscopic incisions pain-free arc of motion today excellent appearing on clinical exam    Results Review:  Imaging Results (Last 24 Hours)       ** No results found for the last 24 hours. **            Procedures            Assessment / Plan      Assessment/Plan:   Problem List Items Addressed This Visit          Musculoskeletal and Injuries    Work related injury     Other Visit Diagnoses         S/P right rotator cuff repair    -  Primary            Right shoulder excellent recovery full duty return to work/remain full duty no restrictions with right arm.  No anticipated additional treatment at this time.  He does have some mild soreness with certain activities but excellent  appearing on clinical exam.  MMI reached    Remain full duty no restrictions    Follow Up: As needed        Isac Arrieta MD, FAAOS  Orthopedic Surgeon, Shoulder Surgery  [facility]  Orthopedics and Sports Medicine  [address], [address]  [place] [address]    & New Location:  [facility] Location  [address], [address]  [place] [address]    05/06/25  08:07 EDT

## 2025-08-26 ENCOUNTER — LAB (OUTPATIENT)
Dept: LAB | Facility: HOSPITAL | Age: 45
End: 2025-08-26
Payer: COMMERCIAL

## 2025-08-26 ENCOUNTER — TRANSCRIBE ORDERS (OUTPATIENT)
Dept: LAB | Facility: HOSPITAL | Age: 45
End: 2025-08-26
Payer: COMMERCIAL

## 2025-08-26 DIAGNOSIS — K42.9 FUNICULAR HERNIA OF UMBILICAL CORD: ICD-10-CM

## 2025-08-26 LAB
ANION GAP SERPL CALCULATED.3IONS-SCNC: 13.5 MMOL/L (ref 5–15)
BASOPHILS # BLD AUTO: 0.04 10*3/MM3 (ref 0–0.2)
BASOPHILS NFR BLD AUTO: 0.3 % (ref 0–1.5)
BUN SERPL-MCNC: 19 MG/DL (ref 6–20)
BUN/CREAT SERPL: 21.3 (ref 7–25)
CALCIUM SPEC-SCNC: 9.4 MG/DL (ref 8.6–10.5)
CHLORIDE SERPL-SCNC: 103 MMOL/L (ref 98–107)
CO2 SERPL-SCNC: 22.5 MMOL/L (ref 22–29)
CREAT SERPL-MCNC: 0.89 MG/DL (ref 0.76–1.27)
DEPRECATED RDW RBC AUTO: 39.8 FL (ref 37–54)
EGFRCR SERPLBLD CKD-EPI 2021: 109 ML/MIN/1.73
EOSINOPHIL # BLD AUTO: 0.06 10*3/MM3 (ref 0–0.4)
EOSINOPHIL NFR BLD AUTO: 0.5 % (ref 0.3–6.2)
ERYTHROCYTE [DISTWIDTH] IN BLOOD BY AUTOMATED COUNT: 12.4 % (ref 12.3–15.4)
GLUCOSE SERPL-MCNC: 83 MG/DL (ref 65–99)
HCT VFR BLD AUTO: 44.3 % (ref 37.5–51)
HGB BLD-MCNC: 14.7 G/DL (ref 13–17.7)
IMM GRANULOCYTES # BLD AUTO: 0.06 10*3/MM3 (ref 0–0.05)
IMM GRANULOCYTES NFR BLD AUTO: 0.5 % (ref 0–0.5)
LYMPHOCYTES # BLD AUTO: 2.95 10*3/MM3 (ref 0.7–3.1)
LYMPHOCYTES NFR BLD AUTO: 22.6 % (ref 19.6–45.3)
MCH RBC QN AUTO: 29.3 PG (ref 26.6–33)
MCHC RBC AUTO-ENTMCNC: 33.2 G/DL (ref 31.5–35.7)
MCV RBC AUTO: 88.2 FL (ref 79–97)
MONOCYTES # BLD AUTO: 1 10*3/MM3 (ref 0.1–0.9)
MONOCYTES NFR BLD AUTO: 7.6 % (ref 5–12)
NEUTROPHILS NFR BLD AUTO: 68.5 % (ref 42.7–76)
NEUTROPHILS NFR BLD AUTO: 8.97 10*3/MM3 (ref 1.7–7)
NRBC BLD AUTO-RTO: 0 /100 WBC (ref 0–0.2)
PLATELET # BLD AUTO: 313 10*3/MM3 (ref 140–450)
PMV BLD AUTO: 9.7 FL (ref 6–12)
POTASSIUM SERPL-SCNC: 4.4 MMOL/L (ref 3.5–5.2)
RBC # BLD AUTO: 5.02 10*6/MM3 (ref 4.14–5.8)
SODIUM SERPL-SCNC: 139 MMOL/L (ref 136–145)
WBC NRBC COR # BLD AUTO: 13.08 10*3/MM3 (ref 3.4–10.8)

## 2025-08-26 PROCEDURE — 85025 COMPLETE CBC W/AUTO DIFF WBC: CPT

## 2025-08-26 PROCEDURE — 36415 COLL VENOUS BLD VENIPUNCTURE: CPT

## 2025-08-26 PROCEDURE — 80048 BASIC METABOLIC PNL TOTAL CA: CPT
